# Patient Record
Sex: MALE | Race: WHITE | NOT HISPANIC OR LATINO | Employment: OTHER | URBAN - METROPOLITAN AREA
[De-identification: names, ages, dates, MRNs, and addresses within clinical notes are randomized per-mention and may not be internally consistent; named-entity substitution may affect disease eponyms.]

---

## 2021-10-08 ENCOUNTER — HOSPITAL ENCOUNTER (EMERGENCY)
Facility: HOSPITAL | Age: 71
Discharge: HOME | End: 2021-10-08
Attending: FAMILY MEDICINE
Payer: MEDICARE

## 2021-10-08 VITALS
DIASTOLIC BLOOD PRESSURE: 89 MMHG | HEART RATE: 86 BPM | TEMPERATURE: 98 F | WEIGHT: 165.38 LBS | OXYGEN SATURATION: 98 % | RESPIRATION RATE: 18 BRPM | SYSTOLIC BLOOD PRESSURE: 132 MMHG

## 2021-10-08 DIAGNOSIS — M25.559 HIP PAIN: Primary | ICD-10-CM

## 2021-10-08 LAB
ALBUMIN SERPL BCP-MCNC: 3.9 G/DL (ref 3.5–5.2)
ALP SERPL-CCNC: 41 U/L (ref 55–135)
ALT SERPL W/O P-5'-P-CCNC: 15 U/L (ref 10–44)
ANION GAP SERPL CALC-SCNC: 16 MMOL/L (ref 8–16)
AST SERPL-CCNC: 28 U/L (ref 10–40)
BASOPHILS # BLD AUTO: 0.01 K/UL (ref 0–0.2)
BASOPHILS NFR BLD: 0.2 % (ref 0–1.9)
BILIRUB SERPL-MCNC: 0.5 MG/DL (ref 0.1–1)
BUN SERPL-MCNC: 17 MG/DL (ref 8–23)
CALCIUM SERPL-MCNC: 10 MG/DL (ref 8.7–10.5)
CHLORIDE SERPL-SCNC: 102 MMOL/L (ref 95–110)
CO2 SERPL-SCNC: 25 MMOL/L (ref 23–29)
CREAT SERPL-MCNC: 2.4 MG/DL (ref 0.5–1.4)
DIFFERENTIAL METHOD: ABNORMAL
EOSINOPHIL # BLD AUTO: 0 K/UL (ref 0–0.5)
EOSINOPHIL NFR BLD: 0.2 % (ref 0–8)
ERYTHROCYTE [DISTWIDTH] IN BLOOD BY AUTOMATED COUNT: 16.2 % (ref 11.5–14.5)
EST. GFR  (AFRICAN AMERICAN): 30 ML/MIN/1.73 M^2
EST. GFR  (NON AFRICAN AMERICAN): 26 ML/MIN/1.73 M^2
GLUCOSE SERPL-MCNC: 124 MG/DL (ref 70–110)
HCT VFR BLD AUTO: 35.1 % (ref 40–54)
HCV AB SERPL QL IA: NEGATIVE
HEP C VIRUS HOLD SPECIMEN: NORMAL
HGB BLD-MCNC: 11.3 G/DL (ref 14–18)
IMM GRANULOCYTES # BLD AUTO: 0.04 K/UL (ref 0–0.04)
IMM GRANULOCYTES NFR BLD AUTO: 1 % (ref 0–0.5)
LACTATE SERPL-SCNC: 1.7 MMOL/L (ref 0.5–2.2)
LYMPHOCYTES # BLD AUTO: 2 K/UL (ref 1–4.8)
LYMPHOCYTES NFR BLD: 49.1 % (ref 18–48)
MCH RBC QN AUTO: 32.6 PG (ref 27–31)
MCHC RBC AUTO-ENTMCNC: 32.2 G/DL (ref 32–36)
MCV RBC AUTO: 101 FL (ref 82–98)
MONOCYTES # BLD AUTO: 0.4 K/UL (ref 0.3–1)
MONOCYTES NFR BLD: 10 % (ref 4–15)
NEUTROPHILS # BLD AUTO: 1.6 K/UL (ref 1.8–7.7)
NEUTROPHILS NFR BLD: 39.5 % (ref 38–73)
NRBC BLD-RTO: 0 /100 WBC
PLATELET # BLD AUTO: 85 K/UL (ref 150–450)
PMV BLD AUTO: 10.4 FL (ref 9.2–12.9)
POCT GLUCOSE: 115 MG/DL (ref 70–110)
POTASSIUM SERPL-SCNC: 3.7 MMOL/L (ref 3.5–5.1)
PROT SERPL-MCNC: 7.8 G/DL (ref 6–8.4)
RBC # BLD AUTO: 3.47 M/UL (ref 4.6–6.2)
SODIUM SERPL-SCNC: 143 MMOL/L (ref 136–145)
WBC # BLD AUTO: 4.11 K/UL (ref 3.9–12.7)

## 2021-10-08 PROCEDURE — 86803 HEPATITIS C AB TEST: CPT | Performed by: EMERGENCY MEDICINE

## 2021-10-08 PROCEDURE — 80053 COMPREHEN METABOLIC PANEL: CPT | Performed by: NURSE PRACTITIONER

## 2021-10-08 PROCEDURE — 99285 EMERGENCY DEPT VISIT HI MDM: CPT | Mod: 25

## 2021-10-08 PROCEDURE — 85025 COMPLETE CBC W/AUTO DIFF WBC: CPT | Performed by: NURSE PRACTITIONER

## 2021-10-08 PROCEDURE — 96360 HYDRATION IV INFUSION INIT: CPT

## 2021-10-08 PROCEDURE — 83605 ASSAY OF LACTIC ACID: CPT | Performed by: NURSE PRACTITIONER

## 2021-10-08 PROCEDURE — 82962 GLUCOSE BLOOD TEST: CPT

## 2021-10-08 PROCEDURE — 25000003 PHARM REV CODE 250: Performed by: FAMILY MEDICINE

## 2021-10-08 RX ORDER — HEPARIN SODIUM,PORCINE/D5W 25000/250
0-40 INTRAVENOUS SOLUTION INTRAVENOUS CONTINUOUS
Status: DISCONTINUED | OUTPATIENT
Start: 2021-10-08 | End: 2021-10-08

## 2021-10-08 RX ORDER — OXYCODONE AND ACETAMINOPHEN 10; 325 MG/1; MG/1
1 TABLET ORAL
Status: COMPLETED | OUTPATIENT
Start: 2021-10-08 | End: 2021-10-08

## 2021-10-08 RX ADMIN — SODIUM CHLORIDE 1000 ML: 0.9 INJECTION, SOLUTION INTRAVENOUS at 04:10

## 2021-10-08 RX ADMIN — OXYCODONE AND ACETAMINOPHEN 1 TABLET: 10; 325 TABLET ORAL at 07:10

## 2021-10-08 RX ADMIN — MAGNESIUM HYDROXIDE/ALUMINUM HYDROXICE/SIMETHICONE 50 ML: 120; 1200; 1200 SUSPENSION ORAL at 06:10

## 2022-04-23 ENCOUNTER — HOSPITAL ENCOUNTER (EMERGENCY)
Facility: HOSPITAL | Age: 72
Discharge: HOME | End: 2022-04-24
Attending: EMERGENCY MEDICINE
Payer: MEDICARE

## 2022-04-23 VITALS
RESPIRATION RATE: 19 BRPM | TEMPERATURE: 96 F | HEART RATE: 59 BPM | SYSTOLIC BLOOD PRESSURE: 131 MMHG | HEIGHT: 73 IN | WEIGHT: 171.06 LBS | DIASTOLIC BLOOD PRESSURE: 95 MMHG | OXYGEN SATURATION: 96 % | BODY MASS INDEX: 22.67 KG/M2

## 2022-04-23 DIAGNOSIS — R41.82 ALTERED MENTAL STATUS: ICD-10-CM

## 2022-04-23 LAB
ALBUMIN SERPL BCP-MCNC: 3.6 G/DL (ref 3.5–5.2)
ALP SERPL-CCNC: 39 U/L (ref 55–135)
ALT SERPL W/O P-5'-P-CCNC: 8 U/L (ref 10–44)
AMMONIA PLAS-SCNC: 29 UMOL/L (ref 10–50)
ANION GAP SERPL CALC-SCNC: 13 MMOL/L (ref 8–16)
APTT BLDCRRT: 31.4 SEC (ref 21–32)
AST SERPL-CCNC: 12 U/L (ref 10–40)
BASOPHILS # BLD AUTO: 0.02 K/UL (ref 0–0.2)
BASOPHILS NFR BLD: 0.3 % (ref 0–1.9)
BILIRUB SERPL-MCNC: 0.3 MG/DL (ref 0.1–1)
BILIRUB UR QL STRIP: NEGATIVE
BNP SERPL-MCNC: 1483 PG/ML (ref 0–99)
BUN SERPL-MCNC: 25 MG/DL (ref 8–23)
CALCIUM SERPL-MCNC: 9.6 MG/DL (ref 8.7–10.5)
CHLORIDE SERPL-SCNC: 101 MMOL/L (ref 95–110)
CK SERPL-CCNC: 131 U/L (ref 20–200)
CLARITY UR: CLEAR
CO2 SERPL-SCNC: 31 MMOL/L (ref 23–29)
COLOR UR: YELLOW
CREAT SERPL-MCNC: 1.5 MG/DL (ref 0.5–1.4)
DIFFERENTIAL METHOD: ABNORMAL
EOSINOPHIL # BLD AUTO: 0.1 K/UL (ref 0–0.5)
EOSINOPHIL NFR BLD: 1.7 % (ref 0–8)
ERYTHROCYTE [DISTWIDTH] IN BLOOD BY AUTOMATED COUNT: 15.5 % (ref 11.5–14.5)
EST. GFR  (AFRICAN AMERICAN): 52 ML/MIN/1.73 M^2
EST. GFR  (NON AFRICAN AMERICAN): 45 ML/MIN/1.73 M^2
GLUCOSE SERPL-MCNC: 108 MG/DL (ref 70–110)
GLUCOSE UR QL STRIP: NEGATIVE
HCT VFR BLD AUTO: 31 % (ref 40–54)
HGB BLD-MCNC: 9.5 G/DL (ref 14–18)
HGB UR QL STRIP: ABNORMAL
IMM GRANULOCYTES # BLD AUTO: 0.02 K/UL (ref 0–0.04)
IMM GRANULOCYTES NFR BLD AUTO: 0.3 % (ref 0–0.5)
INR PPP: 1.1 (ref 0.8–1.2)
KETONES UR QL STRIP: ABNORMAL
LEUKOCYTE ESTERASE UR QL STRIP: NEGATIVE
LYMPHOCYTES # BLD AUTO: 1.1 K/UL (ref 1–4.8)
LYMPHOCYTES NFR BLD: 16 % (ref 18–48)
MAGNESIUM SERPL-MCNC: 1.8 MG/DL (ref 1.6–2.6)
MCH RBC QN AUTO: 30.5 PG (ref 27–31)
MCHC RBC AUTO-ENTMCNC: 30.6 G/DL (ref 32–36)
MCV RBC AUTO: 100 FL (ref 82–98)
MONOCYTES # BLD AUTO: 0.8 K/UL (ref 0.3–1)
MONOCYTES NFR BLD: 11.5 % (ref 4–15)
NEUTROPHILS # BLD AUTO: 4.7 K/UL (ref 1.8–7.7)
NEUTROPHILS NFR BLD: 70.2 % (ref 38–73)
NITRITE UR QL STRIP: NEGATIVE
NRBC BLD-RTO: 0 /100 WBC
PH UR STRIP: 6 [PH] (ref 5–8)
PHOSPHATE SERPL-MCNC: 5.1 MG/DL (ref 2.7–4.5)
PLATELET # BLD AUTO: 146 K/UL (ref 150–450)
PMV BLD AUTO: 11.8 FL (ref 9.2–12.9)
POCT GLUCOSE: 117 MG/DL (ref 70–110)
POTASSIUM SERPL-SCNC: 3.9 MMOL/L (ref 3.5–5.1)
PROT SERPL-MCNC: 7.4 G/DL (ref 6–8.4)
PROT UR QL STRIP: ABNORMAL
PROTHROMBIN TIME: 11.4 SEC (ref 9–12.5)
RBC # BLD AUTO: 3.11 M/UL (ref 4.6–6.2)
SODIUM SERPL-SCNC: 145 MMOL/L (ref 136–145)
SP GR UR STRIP: >=1.03 (ref 1–1.03)
TROPONIN I SERPL DL<=0.01 NG/ML-MCNC: 0.03 NG/ML (ref 0–0.03)
TSH SERPL DL<=0.005 MIU/L-ACNC: 2.78 UIU/ML (ref 0.4–4)
URN SPEC COLLECT METH UR: ABNORMAL
UROBILINOGEN UR STRIP-ACNC: NEGATIVE EU/DL
WBC # BLD AUTO: 6.63 K/UL (ref 3.9–12.7)

## 2022-04-23 PROCEDURE — 82962 GLUCOSE BLOOD TEST: CPT

## 2022-04-23 PROCEDURE — 82550 ASSAY OF CK (CPK): CPT | Performed by: EMERGENCY MEDICINE

## 2022-04-23 PROCEDURE — 84484 ASSAY OF TROPONIN QUANT: CPT | Performed by: EMERGENCY MEDICINE

## 2022-04-23 PROCEDURE — 84100 ASSAY OF PHOSPHORUS: CPT | Performed by: EMERGENCY MEDICINE

## 2022-04-23 PROCEDURE — 84443 ASSAY THYROID STIM HORMONE: CPT | Performed by: EMERGENCY MEDICINE

## 2022-04-23 PROCEDURE — 93005 ELECTROCARDIOGRAM TRACING: CPT

## 2022-04-23 PROCEDURE — 82140 ASSAY OF AMMONIA: CPT | Performed by: EMERGENCY MEDICINE

## 2022-04-23 PROCEDURE — U0002 COVID-19 LAB TEST NON-CDC: HCPCS | Performed by: EMERGENCY MEDICINE

## 2022-04-23 PROCEDURE — 85610 PROTHROMBIN TIME: CPT | Performed by: EMERGENCY MEDICINE

## 2022-04-23 PROCEDURE — 80307 DRUG TEST PRSMV CHEM ANLYZR: CPT | Performed by: EMERGENCY MEDICINE

## 2022-04-23 PROCEDURE — 80053 COMPREHEN METABOLIC PANEL: CPT | Performed by: EMERGENCY MEDICINE

## 2022-04-23 PROCEDURE — 93010 ELECTROCARDIOGRAM REPORT: CPT | Mod: ,,, | Performed by: INTERNAL MEDICINE

## 2022-04-23 PROCEDURE — 83880 ASSAY OF NATRIURETIC PEPTIDE: CPT | Performed by: EMERGENCY MEDICINE

## 2022-04-23 PROCEDURE — 85730 THROMBOPLASTIN TIME PARTIAL: CPT | Performed by: EMERGENCY MEDICINE

## 2022-04-23 PROCEDURE — 93010 EKG 12-LEAD: ICD-10-PCS | Mod: ,,, | Performed by: INTERNAL MEDICINE

## 2022-04-23 PROCEDURE — 85025 COMPLETE CBC W/AUTO DIFF WBC: CPT | Performed by: EMERGENCY MEDICINE

## 2022-04-23 PROCEDURE — 81000 URINALYSIS NONAUTO W/SCOPE: CPT | Performed by: EMERGENCY MEDICINE

## 2022-04-23 PROCEDURE — 99285 EMERGENCY DEPT VISIT HI MDM: CPT | Mod: 25

## 2022-04-23 PROCEDURE — 83735 ASSAY OF MAGNESIUM: CPT | Performed by: EMERGENCY MEDICINE

## 2022-04-23 RX ORDER — BACLOFEN 10 MG/1
10 TABLET ORAL 3 TIMES DAILY PRN
Status: ON HOLD | COMMUNITY
Start: 2022-04-07 | End: 2023-02-07 | Stop reason: HOSPADM

## 2022-04-23 RX ORDER — MORPHINE SULFATE 30 MG/1
TABLET, FILM COATED, EXTENDED RELEASE ORAL
COMMUNITY
Start: 2022-04-12

## 2022-04-23 RX ORDER — ONDANSETRON 4 MG/1
4 TABLET, ORALLY DISINTEGRATING ORAL EVERY 8 HOURS PRN
COMMUNITY
Start: 2022-01-10

## 2022-04-23 RX ORDER — CARVEDILOL 12.5 MG/1
6.25 TABLET ORAL 2 TIMES DAILY
COMMUNITY
Start: 2022-01-10

## 2022-04-23 RX ORDER — GABAPENTIN 600 MG/1
600 TABLET ORAL 3 TIMES DAILY PRN
COMMUNITY
Start: 2021-12-20

## 2022-04-23 RX ORDER — FUROSEMIDE 40 MG/1
40 TABLET ORAL DAILY
COMMUNITY
Start: 2022-04-22

## 2022-04-23 RX ORDER — OXYCODONE HYDROCHLORIDE 10 MG/1
10 TABLET ORAL EVERY 8 HOURS PRN
COMMUNITY
Start: 2022-04-12

## 2022-04-24 LAB
AMPHET+METHAMPHET UR QL: NEGATIVE
AMPHET+METHAMPHET UR QL: NEGATIVE
BACTERIA #/AREA URNS HPF: NORMAL /HPF
BARBITURATES UR QL SCN>200 NG/ML: NEGATIVE
BARBITURATES UR QL SCN>200 NG/ML: NEGATIVE
BENZODIAZ UR QL SCN>200 NG/ML: NEGATIVE
BENZODIAZ UR QL SCN>200 NG/ML: NEGATIVE
BZE UR QL SCN: NEGATIVE
BZE UR QL SCN: NEGATIVE
CANNABINOIDS UR QL SCN: NEGATIVE
CANNABINOIDS UR QL SCN: NEGATIVE
CREAT UR-MCNC: 153 MG/DL (ref 23–375)
CREAT UR-MCNC: 153 MG/DL (ref 23–375)
CTP QC/QA: YES
HYALINE CASTS #/AREA URNS LPF: 1 /LPF
METHADONE UR QL SCN>300 NG/ML: NEGATIVE
METHADONE UR QL SCN>300 NG/ML: NEGATIVE
MICROSCOPIC COMMENT: NORMAL
OPIATES UR QL SCN: ABNORMAL
OPIATES UR QL SCN: ABNORMAL
PCP UR QL SCN>25 NG/ML: NEGATIVE
PCP UR QL SCN>25 NG/ML: NEGATIVE
RBC #/AREA URNS HPF: 0 /HPF (ref 0–4)
SARS-COV-2 RDRP RESP QL NAA+PROBE: NEGATIVE
TOXICOLOGY INFORMATION: ABNORMAL
TOXICOLOGY INFORMATION: ABNORMAL
WBC #/AREA URNS HPF: 0 /HPF (ref 0–5)

## 2022-04-24 PROCEDURE — 25000003 PHARM REV CODE 250: Performed by: EMERGENCY MEDICINE

## 2022-04-24 RX ORDER — LORAZEPAM 1 MG/1
1 TABLET ORAL
Status: COMPLETED | OUTPATIENT
Start: 2022-04-24 | End: 2022-04-24

## 2022-04-24 RX ADMIN — LORAZEPAM 1 MG: 1 TABLET ORAL at 01:04

## 2022-04-24 NOTE — ED PROVIDER NOTES
SCRIBE #1 NOTE: I, Nikita Liz, am scribing for, and in the presence of, Imani Rowe MD. I have scribed the entire note.       History     Chief Complaint   Patient presents with    Altered Mental Status     Pt family c/o of increased jerking movements, hallucinations, swollen feet. No previous hx of parkinsons or seizures     Review of patient's allergies indicates:  No Known Allergies      History of Present Illness     HPI    4/23/2022, 9:29 PM  History obtained from the patient      History of Present Illness: Ramez Miller is a 72 y.o. male patient who presents to the Emergency Department for evaluation of altered mental status which onset 2 days ago. Family reports that patient typically spends most of the day in bed and is wheelchair bound. Family reports that pt is also a former alcoholic but denies any recent use. Symptoms are constant and moderate in severity. No mitigating or exacerbating factors reported. Associated sxs include hallucinations, vomiting, fever, lower back pain, head twitching, and bilateral swollen feet. Patient denies any chills, chest pain, SOB, coughing, wheezing, and all other sxs at this time. Prior Tx includes Lasix. No further complaints or concerns at this time.       Arrival mode: Personal transportation    PCP: Primary Doctor No        Past Medical History:  No past medical history on file.    Past Surgical History:  No past surgical history on file.      Family History:  No family history on file.    Social History:  Social History     Tobacco Use    Smoking status: Not on file    Smokeless tobacco: Not on file   Substance and Sexual Activity    Alcohol use: Not on file    Drug use: Not on file    Sexual activity: Not on file        Review of Systems     Review of Systems   Constitutional: Positive for fever.   HENT: Negative for sore throat.    Respiratory: Negative for shortness of breath.    Cardiovascular: Negative for chest pain.   Gastrointestinal:  "Positive for vomiting. Negative for nausea.   Genitourinary: Negative for dysuria.   Musculoskeletal: Positive for back pain (Lower back).        (+) bilateral edema of feet       Skin: Negative for rash.   Neurological: Negative for weakness.        (+) intermittent head twitching   Hematological: Does not bruise/bleed easily.   Psychiatric/Behavioral: Positive for hallucinations.   All other systems reviewed and are negative.       Physical Exam     Initial Vitals [04/23/22 2006]   BP Pulse Resp Temp SpO2   (!) 125/56 (!) 47 19 96.3 °F (35.7 °C) 95 %      MAP       --          Physical Exam  Nursing Notes and Vital Signs Reviewed.  Constitutional: Patient is in no acute distress. Well-developed and well-nourished.  Head: Atraumatic. Normocephalic.  Eyes: PERRL. EOM intact. Conjunctivae are not pale. No scleral icterus.  ENT: Mucous membranes are moist. Oropharynx is clear and symmetric.    Neck: Supple. Full ROM. No lymphadenopathy.  Cardiovascular: Regular rate. Regular/Irregular rhythm. No murmurs, rubs, or gallops. Distal pulses are 2+ and symmetric.  Pulmonary/Chest: No respiratory distress. Clear to auscultation bilaterally. No wheezing or rales.  Abdominal: Soft and non-distended.  There is no tenderness.  No rebound, guarding, or rigidity. Good bowel sounds.  Genitourinary: No CVA tenderness  Musculoskeletal: Moves all extremities. No obvious deformities. No calf tenderness. 2+ bilateral pitting edema.  Skin: Warm and dry.  Neurological:  Alert, awake, and appropriate.  Normal speech.  No acute focal neurological deficits are appreciated.  Psychiatric: Normal affect. Good eye contact. Appropriate in content.     ED Course   Procedures  ED Vital Signs:  Vitals:    04/23/22 2006 04/23/22 2102 04/23/22 2132   BP: (!) 125/56 (!) 149/67 (!) 131/95   Pulse: (!) 47 88 (!) 59   Resp: 19 15 19   Temp: 96.3 °F (35.7 °C)     TempSrc: Axillary     SpO2: 95%  96%   Weight:   77.6 kg (171 lb 1.2 oz)   Height:   6' 1" " (1.854 m)       Abnormal Lab Results:  Labs Reviewed   CBC W/ AUTO DIFFERENTIAL - Abnormal; Notable for the following components:       Result Value    RBC 3.11 (*)     Hemoglobin 9.5 (*)     Hematocrit 31.0 (*)      (*)     MCHC 30.6 (*)     RDW 15.5 (*)     Platelets 146 (*)     Lymph % 16.0 (*)     All other components within normal limits   COMPREHENSIVE METABOLIC PANEL - Abnormal; Notable for the following components:    CO2 31 (*)     BUN 25 (*)     Creatinine 1.5 (*)     Alkaline Phosphatase 39 (*)     ALT 8 (*)     eGFR if  52 (*)     eGFR if non  45 (*)     All other components within normal limits   B-TYPE NATRIURETIC PEPTIDE - Abnormal; Notable for the following components:    BNP 1,483 (*)     All other components within normal limits   PHOSPHORUS - Abnormal; Notable for the following components:    Phosphorus 5.1 (*)     All other components within normal limits   TROPONIN I - Abnormal; Notable for the following components:    Troponin I 0.030 (*)     All other components within normal limits   URINALYSIS, REFLEX TO URINE CULTURE - Abnormal; Notable for the following components:    Specific Gravity, UA >=1.030 (*)     Protein, UA 2+ (*)     Ketones, UA Trace (*)     Occult Blood UA Trace (*)     All other components within normal limits    Narrative:     Specimen Source->Urine   DRUG SCREEN PANEL, URINE EMERGENCY - Abnormal; Notable for the following components:    Opiate Scrn, Ur Presumptive Positive (*)     All other components within normal limits    Narrative:     Specimen Source->Urine   DRUG SCREEN PANEL, URINE EMERGENCY - Abnormal; Notable for the following components:    Opiate Scrn, Ur Presumptive Positive (*)     All other components within normal limits    Narrative:     Specimen Source->Urine   POCT GLUCOSE - Abnormal; Notable for the following components:    POCT Glucose 117 (*)     All other components within normal limits   PROTIME-INR   APTT   CK    MAGNESIUM   TSH   AMMONIA   URINALYSIS MICROSCOPIC    Narrative:     Specimen Source->Urine   SARS-COV-2 RDRP GENE        All Lab Results:  Results for orders placed or performed during the hospital encounter of 04/23/22   CBC auto differential   Result Value Ref Range    WBC 6.63 3.90 - 12.70 K/uL    RBC 3.11 (L) 4.60 - 6.20 M/uL    Hemoglobin 9.5 (L) 14.0 - 18.0 g/dL    Hematocrit 31.0 (L) 40.0 - 54.0 %     (H) 82 - 98 fL    MCH 30.5 27.0 - 31.0 pg    MCHC 30.6 (L) 32.0 - 36.0 g/dL    RDW 15.5 (H) 11.5 - 14.5 %    Platelets 146 (L) 150 - 450 K/uL    MPV 11.8 9.2 - 12.9 fL    Immature Granulocytes 0.3 0.0 - 0.5 %    Gran # (ANC) 4.7 1.8 - 7.7 K/uL    Immature Grans (Abs) 0.02 0.00 - 0.04 K/uL    Lymph # 1.1 1.0 - 4.8 K/uL    Mono # 0.8 0.3 - 1.0 K/uL    Eos # 0.1 0.0 - 0.5 K/uL    Baso # 0.02 0.00 - 0.20 K/uL    nRBC 0 0 /100 WBC    Gran % 70.2 38.0 - 73.0 %    Lymph % 16.0 (L) 18.0 - 48.0 %    Mono % 11.5 4.0 - 15.0 %    Eosinophil % 1.7 0.0 - 8.0 %    Basophil % 0.3 0.0 - 1.9 %    Differential Method Automated    Comprehensive metabolic panel   Result Value Ref Range    Sodium 145 136 - 145 mmol/L    Potassium 3.9 3.5 - 5.1 mmol/L    Chloride 101 95 - 110 mmol/L    CO2 31 (H) 23 - 29 mmol/L    Glucose 108 70 - 110 mg/dL    BUN 25 (H) 8 - 23 mg/dL    Creatinine 1.5 (H) 0.5 - 1.4 mg/dL    Calcium 9.6 8.7 - 10.5 mg/dL    Total Protein 7.4 6.0 - 8.4 g/dL    Albumin 3.6 3.5 - 5.2 g/dL    Total Bilirubin 0.3 0.1 - 1.0 mg/dL    Alkaline Phosphatase 39 (L) 55 - 135 U/L    AST 12 10 - 40 U/L    ALT 8 (L) 10 - 44 U/L    Anion Gap 13 8 - 16 mmol/L    eGFR if African American 52 (A) >60 mL/min/1.73 m^2    eGFR if non African American 45 (A) >60 mL/min/1.73 m^2   Protime-INR   Result Value Ref Range    Prothrombin Time 11.4 9.0 - 12.5 sec    INR 1.1 0.8 - 1.2   APTT   Result Value Ref Range    aPTT 31.4 21.0 - 32.0 sec   CPK   Result Value Ref Range     20 - 200 U/L   Brain natriuretic peptide   Result Value  Ref Range    BNP 1,483 (H) 0 - 99 pg/mL   Phosphorus   Result Value Ref Range    Phosphorus 5.1 (H) 2.7 - 4.5 mg/dL   Magnesium   Result Value Ref Range    Magnesium 1.8 1.6 - 2.6 mg/dL   Troponin I   Result Value Ref Range    Troponin I 0.030 (H) 0.000 - 0.026 ng/mL   Urinalysis, Reflex to Urine Culture Urine, Clean Catch    Specimen: Urine   Result Value Ref Range    Specimen UA Urine, Clean Catch     Color, UA Yellow Yellow, Straw, Jelena    Appearance, UA Clear Clear    pH, UA 6.0 5.0 - 8.0    Specific Gravity, UA >=1.030 (A) 1.005 - 1.030    Protein, UA 2+ (A) Negative    Glucose, UA Negative Negative    Ketones, UA Trace (A) Negative    Bilirubin (UA) Negative Negative    Occult Blood UA Trace (A) Negative    Nitrite, UA Negative Negative    Urobilinogen, UA Negative <2.0 EU/dL    Leukocytes, UA Negative Negative   Drug screen panel, emergency   Result Value Ref Range    Benzodiazepines Negative Negative    Methadone metabolites Negative Negative    Cocaine (Metab.) Negative Negative    Opiate Scrn, Ur Presumptive Positive (A) Negative    Barbiturate Screen, Ur Negative Negative    Amphetamine Screen, Ur Negative Negative    THC Negative Negative    Phencyclidine Negative Negative    Creatinine, Urine 153.0 23.0 - 375.0 mg/dL    Toxicology Information SEE COMMENT    TSH   Result Value Ref Range    TSH 2.783 0.400 - 4.000 uIU/mL   Ammonia   Result Value Ref Range    Ammonia 29 10 - 50 umol/L   Drug screen panel, emergency   Result Value Ref Range    Benzodiazepines Negative Negative    Methadone metabolites Negative Negative    Cocaine (Metab.) Negative Negative    Opiate Scrn, Ur Presumptive Positive (A) Negative    Barbiturate Screen, Ur Negative Negative    Amphetamine Screen, Ur Negative Negative    THC Negative Negative    Phencyclidine Negative Negative    Creatinine, Urine 153.0 23.0 - 375.0 mg/dL    Toxicology Information SEE COMMENT    Urinalysis Microscopic   Result Value Ref Range    RBC, UA 0 0 - 4  /hpf    WBC, UA 0 0 - 5 /hpf    Bacteria Rare None-Occ /hpf    Hyaline Casts, UA 1 0-1/lpf /lpf    Microscopic Comment SEE COMMENT    POCT COVID-19 Rapid Screening   Result Value Ref Range    POC Rapid COVID Negative Negative     Acceptable Yes    POCT glucose   Result Value Ref Range    POCT Glucose 117 (H) 70 - 110 mg/dL         Imaging Results:  Imaging Results          CT Head Without Contrast (Final result)  Result time 04/23/22 23:23:29    Final result by Cuate Joya MD (04/23/22 23:23:29)                 Impression:      No acute intracranial CT abnormality.    All CT scans at this facility are performed  using dose modulation techniques as appropriate to performed exam including the following:  automated exposure control; adjustment of mA and/or kV according to the patients size (this includes techniques or standardized protocols for targeted exams where dose is matched to indication/reason for exam: i.e. extremities or head);  iterative reconstruction technique.      Electronically signed by: Cuate Joya  Date:    04/23/2022  Time:    23:23             Narrative:    EXAMINATION:  CT HEAD WITHOUT CONTRAST    CLINICAL HISTORY:  Mental status change, unknown cause;    TECHNIQUE:  Low dose axial CT images obtained throughout the head without intravenous contrast. Sagittal and coronal reconstructions were performed.    COMPARISON:  10/08/2021    FINDINGS:  Intracranial compartment:    Ventricles and sulci are normal in size for age without evidence of hydrocephalus. No extra-axial blood or fluid collections.    The brain parenchyma appears normal. No parenchymal mass, hemorrhage, edema or major vascular distribution infarct.    Skull/extracranial contents (limited evaluation): No fracture. Mastoid air cells and paranasal sinuses are essentially clear.                               X-Ray Chest AP Portable (Final result)  Result time 04/23/22 21:20:27    Final result by Cuate Joya MD  (04/23/22 21:20:27)                 Impression:      No acute abnormality.      Electronically signed by: Cuate Mahnaz  Date:    04/23/2022  Time:    21:20             Narrative:    EXAMINATION:  XR CHEST AP PORTABLE    CLINICAL HISTORY:  Altered mental status, unspecified    TECHNIQUE:  Single frontal view of the chest was performed.    COMPARISON:  None    FINDINGS:  The lungs are clear, with normal appearance of pulmonary vasculature and no pleural effusion or pneumothorax.    The cardiac silhouette is borderline enlarged.  The hilar and mediastinal contours are unremarkable.    Bones are intact.                                 The EKG was ordered, reviewed, and independently interpreted by the ED provider.  Interpretation time: 21:02  Rate: 84 BPM  Rhythm: Sinus rhythm with frequent premature ventricular complexes in a pattern of bigeminy   Interpretation: Left axis deviation. RBBB. No STEMI.           The Emergency Provider reviewed the vital signs and test results, which are outlined above.     ED Discussion       12:34 AM: Discussed pt's case with Dr. Escalante ([facility] medicine) who notes that pt's creatine is at baseline, and recommends pt be seen by movement specialist.    12:57 AM: Reassessed pt at this time. Discussed pt dx and plan of tx. Gave pt all f/u and return to the ED instructions. All questions and concerns were addressed at this time. Pt expresses understanding of information and instructions, and is comfortable with plan to discharge. Pt is stable for discharge.    I discussed with patient and/or family/caretaker that evaluation in the ED does not suggest any emergent or life threatening medical conditions requiring immediate intervention beyond what was provided in the ED, and I believe patient is safe for discharge.  Regardless, an unremarkable evaluation in the ED does not preclude the development or presence of a serious of life threatening condition. As such, patient was instructed to  return immediately for any worsening or change in current symptoms.           Medical Decision Making:   Clinical Tests:   Lab Tests: Ordered and Reviewed  Radiological Study: Ordered and Reviewed  Medical Tests: Ordered and Reviewed           ED Medication(s):  Medications   LORazepam tablet 1 mg (1 mg Oral Given 4/24/22 0104)       Discharge Medication List as of 4/24/2022 12:49 AM           Follow-up Information     Your doctor In 1 day.           [facility] - Emergency Dept..    Specialty: Emergency Medicine  Why: As needed, If symptoms worsen  Contact information:  [id number] [place] [id number] [phone]                           Scribe Attestation:   Scribe #1: I performed the above scribed service and the documentation accurately describes the services I performed. I attest to the accuracy of the note.     Attending:   Physician Attestation Statement for Scribe #1: I, Imani Rowe MD, personally performed the services described in this documentation, as scribed by Nikita Hill, in my presence, and it is both accurate and complete.           Clinical Impression       ICD-10-CM ICD-9-CM   1. Altered mental status  R41.82 780.97   2. Altered mental status  R41.82 780.97       Disposition:   Disposition: Discharged  Condition: Stable         Imani Rowe MD  04/24/22 [id number]

## 2022-12-06 ENCOUNTER — PATIENT MESSAGE (OUTPATIENT)
Dept: RESEARCH | Facility: HOSPITAL | Age: 72
End: 2022-12-06
Payer: MEDICARE

## 2023-02-05 ENCOUNTER — HOSPITAL ENCOUNTER (INPATIENT)
Facility: HOSPITAL | Age: 73
LOS: 1 days | Discharge: HOME HEALTH | DRG: 896 | End: 2023-02-07
Attending: EMERGENCY MEDICINE | Admitting: FAMILY MEDICINE
Payer: MEDICARE

## 2023-02-05 DIAGNOSIS — R41.82 ALTERED MENTAL STATUS, UNSPECIFIED ALTERED MENTAL STATUS TYPE: Primary | ICD-10-CM

## 2023-02-05 DIAGNOSIS — I50.9 CHF (CONGESTIVE HEART FAILURE): ICD-10-CM

## 2023-02-05 DIAGNOSIS — R07.9 CHEST PAIN: ICD-10-CM

## 2023-02-05 PROBLEM — I10 HYPERTENSION: Status: ACTIVE | Noted: 2023-02-05

## 2023-02-05 PROBLEM — D69.6 THROMBOCYTOPENIA: Status: ACTIVE | Noted: 2023-02-05

## 2023-02-05 PROBLEM — R41.0 DISORIENTATION: Status: ACTIVE | Noted: 2023-02-05

## 2023-02-05 PROBLEM — G89.29 CHRONIC PAIN: Status: ACTIVE | Noted: 2023-02-05

## 2023-02-05 PROBLEM — N17.9 AKI (ACUTE KIDNEY INJURY): Status: ACTIVE | Noted: 2023-02-05

## 2023-02-05 LAB
ALBUMIN SERPL BCP-MCNC: 3.8 G/DL (ref 3.5–5.2)
ALP SERPL-CCNC: 74 U/L (ref 55–135)
ALT SERPL W/O P-5'-P-CCNC: 34 U/L (ref 10–44)
AMMONIA PLAS-SCNC: 35 UMOL/L (ref 10–50)
AMPHET+METHAMPHET UR QL: NEGATIVE
ANION GAP SERPL CALC-SCNC: 19 MMOL/L (ref 8–16)
AST SERPL-CCNC: 45 U/L (ref 10–40)
AV INDEX (PROSTH): 0.84
AV MEAN GRADIENT: 2 MMHG
AV PEAK GRADIENT: 3 MMHG
AV VALVE AREA: 2.61 CM2
AV VELOCITY RATIO: 1.06
BACTERIA #/AREA URNS HPF: ABNORMAL /HPF
BARBITURATES UR QL SCN>200 NG/ML: NEGATIVE
BASOPHILS # BLD AUTO: 0.03 K/UL (ref 0–0.2)
BASOPHILS NFR BLD: 0.5 % (ref 0–1.9)
BENZODIAZ UR QL SCN>200 NG/ML: NEGATIVE
BILIRUB SERPL-MCNC: 1.1 MG/DL (ref 0.1–1)
BILIRUB UR QL STRIP: NEGATIVE
BNP SERPL-MCNC: 161 PG/ML (ref 0–99)
BUN SERPL-MCNC: 29 MG/DL (ref 8–23)
BZE UR QL SCN: NEGATIVE
CALCIUM SERPL-MCNC: 8.6 MG/DL (ref 8.7–10.5)
CANNABINOIDS UR QL SCN: NEGATIVE
CHLORIDE SERPL-SCNC: 94 MMOL/L (ref 95–110)
CLARITY UR: CLEAR
CO2 SERPL-SCNC: 27 MMOL/L (ref 23–29)
COLOR UR: YELLOW
CREAT SERPL-MCNC: 1.8 MG/DL (ref 0.5–1.4)
CREAT UR-MCNC: 214.3 MG/DL (ref 23–375)
CV ECHO LV RWT: 0.6 CM
DIFFERENTIAL METHOD: ABNORMAL
DOP CALC AO PEAK VEL: 0.88 M/S
DOP CALC AO VTI: 19.3 CM
DOP CALC LVOT AREA: 3.1 CM2
DOP CALC LVOT DIAMETER: 1.99 CM
DOP CALC LVOT PEAK VEL: 0.93 M/S
DOP CALC LVOT STROKE VOLUME: 50.36 CM3
DOP CALCLVOT PEAK VEL VTI: 16.2 CM
E WAVE DECELERATION TIME: 254.75 MSEC
E/A RATIO: 0.83
E/E' RATIO: 9.07 M/S
ECHO LV POSTERIOR WALL: 1.32 CM (ref 0.6–1.1)
EJECTION FRACTION: 55 %
EOSINOPHIL # BLD AUTO: 0 K/UL (ref 0–0.5)
EOSINOPHIL NFR BLD: 0.5 % (ref 0–8)
ERYTHROCYTE [DISTWIDTH] IN BLOOD BY AUTOMATED COUNT: 15 % (ref 11.5–14.5)
EST. GFR  (NO RACE VARIABLE): 39 ML/MIN/1.73 M^2
ETHANOL SERPL-MCNC: <10 MG/DL
FRACTIONAL SHORTENING: 23 % (ref 28–44)
GLUCOSE SERPL-MCNC: 96 MG/DL (ref 70–110)
GLUCOSE UR QL STRIP: NEGATIVE
HCT VFR BLD AUTO: 39.7 % (ref 40–54)
HGB BLD-MCNC: 13.4 G/DL (ref 14–18)
HGB UR QL STRIP: ABNORMAL
HYALINE CASTS #/AREA URNS LPF: 3 /LPF
IMM GRANULOCYTES # BLD AUTO: 0.04 K/UL (ref 0–0.04)
IMM GRANULOCYTES NFR BLD AUTO: 0.6 % (ref 0–0.5)
INTERVENTRICULAR SEPTUM: 1.3 CM (ref 0.6–1.1)
IVC DIAMETER: 1.1 CM
IVRT: 95.15 MSEC
KETONES UR QL STRIP: NEGATIVE
LA MAJOR: 7.03 CM
LA MINOR: 4.7 CM
LEFT ATRIUM SIZE: 4.26 CM
LEFT INTERNAL DIMENSION IN SYSTOLE: 3.39 CM (ref 2.1–4)
LEFT VENTRICLE DIASTOLIC VOLUME: 89.13 ML
LEFT VENTRICLE SYSTOLIC VOLUME: 46.99 ML
LEFT VENTRICULAR INTERNAL DIMENSION IN DIASTOLE: 4.43 CM (ref 3.5–6)
LEFT VENTRICULAR MASS: 219.8 G
LEUKOCYTE ESTERASE UR QL STRIP: ABNORMAL
LV LATERAL E/E' RATIO: 8.5 M/S
LV SEPTAL E/E' RATIO: 9.71 M/S
LVOT MG: 1.25 MMHG
LVOT MV: 0.51 CM/S
LYMPHOCYTES # BLD AUTO: 1.1 K/UL (ref 1–4.8)
LYMPHOCYTES NFR BLD: 17.9 % (ref 18–48)
MCH RBC QN AUTO: 32 PG (ref 27–31)
MCHC RBC AUTO-ENTMCNC: 33.8 G/DL (ref 32–36)
MCV RBC AUTO: 95 FL (ref 82–98)
METHADONE UR QL SCN>300 NG/ML: NEGATIVE
MICROSCOPIC COMMENT: ABNORMAL
MONOCYTES # BLD AUTO: 0.8 K/UL (ref 0.3–1)
MONOCYTES NFR BLD: 12.3 % (ref 4–15)
MV PEAK A VEL: 0.82 M/S
MV PEAK E VEL: 0.68 M/S
MV STENOSIS PRESSURE HALF TIME: 73.88 MS
MV VALVE AREA P 1/2 METHOD: 2.98 CM2
NEUTROPHILS # BLD AUTO: 4.3 K/UL (ref 1.8–7.7)
NEUTROPHILS NFR BLD: 68.2 % (ref 38–73)
NITRITE UR QL STRIP: NEGATIVE
NRBC BLD-RTO: 0 /100 WBC
OPIATES UR QL SCN: ABNORMAL
PCP UR QL SCN>25 NG/ML: NEGATIVE
PH UR STRIP: 6 [PH] (ref 5–8)
PLATELET # BLD AUTO: 110 K/UL (ref 150–450)
PMV BLD AUTO: 11.2 FL (ref 9.2–12.9)
POTASSIUM SERPL-SCNC: 3.9 MMOL/L (ref 3.5–5.1)
PROT SERPL-MCNC: 7.6 G/DL (ref 6–8.4)
PROT UR QL STRIP: ABNORMAL
PV MV: 0.67 M/S
PV PEAK VELOCITY: 0.88 CM/S
RA MAJOR: 5.62 CM
RBC # BLD AUTO: 4.19 M/UL (ref 4.6–6.2)
RBC #/AREA URNS HPF: 13 /HPF (ref 0–4)
SODIUM SERPL-SCNC: 140 MMOL/L (ref 136–145)
SP GR UR STRIP: 1.02 (ref 1–1.03)
STJ: 2.16 CM
TDI LATERAL: 0.08 M/S
TDI SEPTAL: 0.07 M/S
TDI: 0.08 M/S
TOXICOLOGY INFORMATION: ABNORMAL
TROPONIN I SERPL DL<=0.01 NG/ML-MCNC: 0.03 NG/ML (ref 0–0.03)
TROPONIN I SERPL DL<=0.01 NG/ML-MCNC: 0.03 NG/ML (ref 0–0.03)
TROPONIN I SERPL DL<=0.01 NG/ML-MCNC: 0.05 NG/ML (ref 0–0.03)
UNIDENT CRYS URNS QL MICRO: ABNORMAL
URN SPEC COLLECT METH UR: ABNORMAL
UROBILINOGEN UR STRIP-ACNC: ABNORMAL EU/DL
WBC # BLD AUTO: 6.27 K/UL (ref 3.9–12.7)
WBC #/AREA URNS HPF: 6 /HPF (ref 0–5)
WBC CLUMPS URNS QL MICRO: ABNORMAL

## 2023-02-05 PROCEDURE — 80307 DRUG TEST PRSMV CHEM ANLYZR: CPT | Performed by: PHYSICIAN ASSISTANT

## 2023-02-05 PROCEDURE — 93010 ELECTROCARDIOGRAM REPORT: CPT | Mod: ,,, | Performed by: STUDENT IN AN ORGANIZED HEALTH CARE EDUCATION/TRAINING PROGRAM

## 2023-02-05 PROCEDURE — 96366 THER/PROPH/DIAG IV INF ADDON: CPT

## 2023-02-05 PROCEDURE — G0378 HOSPITAL OBSERVATION PER HR: HCPCS

## 2023-02-05 PROCEDURE — 85025 COMPLETE CBC W/AUTO DIFF WBC: CPT | Performed by: PHYSICIAN ASSISTANT

## 2023-02-05 PROCEDURE — 25000003 PHARM REV CODE 250: Performed by: FAMILY MEDICINE

## 2023-02-05 PROCEDURE — 84484 ASSAY OF TROPONIN QUANT: CPT | Mod: 91 | Performed by: PHYSICIAN ASSISTANT

## 2023-02-05 PROCEDURE — 93005 ELECTROCARDIOGRAM TRACING: CPT

## 2023-02-05 PROCEDURE — 84484 ASSAY OF TROPONIN QUANT: CPT | Mod: 91 | Performed by: NURSE PRACTITIONER

## 2023-02-05 PROCEDURE — 63600175 PHARM REV CODE 636 W HCPCS: Performed by: EMERGENCY MEDICINE

## 2023-02-05 PROCEDURE — 63600175 PHARM REV CODE 636 W HCPCS: Performed by: NURSE PRACTITIONER

## 2023-02-05 PROCEDURE — 81000 URINALYSIS NONAUTO W/SCOPE: CPT | Mod: 59 | Performed by: PHYSICIAN ASSISTANT

## 2023-02-05 PROCEDURE — 96372 THER/PROPH/DIAG INJ SC/IM: CPT | Performed by: FAMILY MEDICINE

## 2023-02-05 PROCEDURE — 82140 ASSAY OF AMMONIA: CPT | Performed by: EMERGENCY MEDICINE

## 2023-02-05 PROCEDURE — 96374 THER/PROPH/DIAG INJ IV PUSH: CPT

## 2023-02-05 PROCEDURE — 80053 COMPREHEN METABOLIC PANEL: CPT | Performed by: PHYSICIAN ASSISTANT

## 2023-02-05 PROCEDURE — 99285 EMERGENCY DEPT VISIT HI MDM: CPT | Mod: 25

## 2023-02-05 PROCEDURE — 63600175 PHARM REV CODE 636 W HCPCS: Performed by: FAMILY MEDICINE

## 2023-02-05 PROCEDURE — 96375 TX/PRO/DX INJ NEW DRUG ADDON: CPT

## 2023-02-05 PROCEDURE — 83880 ASSAY OF NATRIURETIC PEPTIDE: CPT | Performed by: PHYSICIAN ASSISTANT

## 2023-02-05 PROCEDURE — 93010 EKG 12-LEAD: ICD-10-PCS | Mod: ,,, | Performed by: STUDENT IN AN ORGANIZED HEALTH CARE EDUCATION/TRAINING PROGRAM

## 2023-02-05 PROCEDURE — 25000003 PHARM REV CODE 250: Performed by: PHYSICIAN ASSISTANT

## 2023-02-05 PROCEDURE — 82077 ASSAY SPEC XCP UR&BREATH IA: CPT | Performed by: EMERGENCY MEDICINE

## 2023-02-05 RX ORDER — ENOXAPARIN SODIUM 100 MG/ML
40 INJECTION SUBCUTANEOUS EVERY 24 HOURS
Status: DISCONTINUED | OUTPATIENT
Start: 2023-02-05 | End: 2023-02-07 | Stop reason: HOSPADM

## 2023-02-05 RX ORDER — LORAZEPAM 2 MG/ML
1 INJECTION INTRAMUSCULAR ONCE
Status: COMPLETED | OUTPATIENT
Start: 2023-02-05 | End: 2023-02-05

## 2023-02-05 RX ORDER — LORAZEPAM 2 MG/ML
1 INJECTION INTRAMUSCULAR
Status: COMPLETED | OUTPATIENT
Start: 2023-02-05 | End: 2023-02-05

## 2023-02-05 RX ORDER — FUROSEMIDE 40 MG/1
40 TABLET ORAL DAILY
Status: DISCONTINUED | OUTPATIENT
Start: 2023-02-05 | End: 2023-02-07

## 2023-02-05 RX ORDER — ACETAMINOPHEN 325 MG/1
650 TABLET ORAL EVERY 4 HOURS PRN
Status: DISCONTINUED | OUTPATIENT
Start: 2023-02-05 | End: 2023-02-07 | Stop reason: HOSPADM

## 2023-02-05 RX ORDER — HYDRALAZINE HYDROCHLORIDE 20 MG/ML
20 INJECTION INTRAMUSCULAR; INTRAVENOUS
Status: DISCONTINUED | OUTPATIENT
Start: 2023-02-05 | End: 2023-02-05

## 2023-02-05 RX ORDER — SODIUM CHLORIDE 0.9 % (FLUSH) 0.9 %
10 SYRINGE (ML) INJECTION
Status: DISCONTINUED | OUTPATIENT
Start: 2023-02-05 | End: 2023-02-07 | Stop reason: HOSPADM

## 2023-02-05 RX ORDER — CARVEDILOL 6.25 MG/1
6.25 TABLET ORAL 2 TIMES DAILY
Status: DISCONTINUED | OUTPATIENT
Start: 2023-02-05 | End: 2023-02-07 | Stop reason: HOSPADM

## 2023-02-05 RX ORDER — HYDRALAZINE HYDROCHLORIDE 20 MG/ML
10 INJECTION INTRAMUSCULAR; INTRAVENOUS
Status: COMPLETED | OUTPATIENT
Start: 2023-02-05 | End: 2023-02-05

## 2023-02-05 RX ORDER — HYDROCODONE BITARTRATE AND ACETAMINOPHEN 5; 325 MG/1; MG/1
1 TABLET ORAL EVERY 4 HOURS PRN
Status: DISCONTINUED | OUTPATIENT
Start: 2023-02-05 | End: 2023-02-07 | Stop reason: HOSPADM

## 2023-02-05 RX ORDER — ASPIRIN 325 MG
325 TABLET ORAL
Status: COMPLETED | OUTPATIENT
Start: 2023-02-05 | End: 2023-02-05

## 2023-02-05 RX ORDER — ACETAMINOPHEN 325 MG/1
650 TABLET ORAL EVERY 8 HOURS PRN
Status: DISCONTINUED | OUTPATIENT
Start: 2023-02-05 | End: 2023-02-07 | Stop reason: HOSPADM

## 2023-02-05 RX ORDER — SODIUM CHLORIDE 9 MG/ML
INJECTION, SOLUTION INTRAVENOUS CONTINUOUS
Status: DISCONTINUED | OUTPATIENT
Start: 2023-02-05 | End: 2023-02-07

## 2023-02-05 RX ADMIN — LORAZEPAM 1 MG: 2 INJECTION INTRAMUSCULAR; INTRAVENOUS at 12:02

## 2023-02-05 RX ADMIN — ASPIRIN 325 MG ORAL TABLET 325 MG: 325 PILL ORAL at 09:02

## 2023-02-05 RX ADMIN — ACETAMINOPHEN 650 MG: 325 TABLET ORAL at 07:02

## 2023-02-05 RX ADMIN — SODIUM CHLORIDE: 9 INJECTION, SOLUTION INTRAVENOUS at 01:02

## 2023-02-05 RX ADMIN — CARVEDILOL 6.25 MG: 6.25 TABLET, FILM COATED ORAL at 08:02

## 2023-02-05 RX ADMIN — FUROSEMIDE 40 MG: 40 TABLET ORAL at 01:02

## 2023-02-05 RX ADMIN — LORAZEPAM 1 MG: 2 INJECTION INTRAMUSCULAR; INTRAVENOUS at 08:02

## 2023-02-05 RX ADMIN — SACUBITRIL AND VALSARTAN 1 TABLET: 24; 26 TABLET, FILM COATED ORAL at 09:02

## 2023-02-05 RX ADMIN — HYDRALAZINE HYDROCHLORIDE 10 MG: 20 INJECTION, SOLUTION INTRAMUSCULAR; INTRAVENOUS at 12:02

## 2023-02-05 RX ADMIN — ENOXAPARIN SODIUM 40 MG: 40 INJECTION SUBCUTANEOUS at 06:02

## 2023-02-05 NOTE — SUBJECTIVE & OBJECTIVE
Past Medical History:   Diagnosis Date    Chronic pain     Hypertension        History reviewed. No pertinent surgical history.    Review of patient's allergies indicates:  No Known Allergies    No current facility-administered medications on file prior to encounter.     Current Outpatient Medications on File Prior to Encounter   Medication Sig    baclofen (LIORESAL) 10 MG tablet Take 10 mg by mouth 3 (three) times daily as needed.    carvediloL (COREG) 12.5 MG tablet Take 6.25 mg by mouth 2 (two) times daily.    furosemide (LASIX) 40 MG tablet Take 40 mg by mouth once daily.    gabapentin (NEURONTIN) 600 MG tablet Take 600 mg by mouth 3 (three) times daily as needed.    morphine (MS CONTIN) 30 MG 12 hr tablet SMARTSI Tablet(s) By Mouth Every 12 Hours PRN    ondansetron (ZOFRAN-ODT) 4 MG TbDL Take 4 mg by mouth every 8 (eight) hours as needed.    oxyCODONE (ROXICODONE) 10 mg Tab immediate release tablet Take 10 mg by mouth every 8 (eight) hours as needed.    sacubitriL-valsartan (ENTRESTO) 24-26 mg per tablet Take 1 tablet by mouth 2 (two) times daily.     Family History    None       Tobacco Use    Smoking status: Not on file    Smokeless tobacco: Not on file   Substance and Sexual Activity    Alcohol use: Not on file    Drug use: Not on file    Sexual activity: Not on file     Review of Systems   Unable to perform ROS: Mental status change   Objective:     Vital Signs (Most Recent):  Temp: 98.1 °F (36.7 °C) (23 0842)  Pulse: 102 (23 1230)  Resp: (!) 26 (23 1230)  BP: 131/89 (23 1230)  SpO2: (!) 94 % (23 1230)   Vital Signs (24h Range):  Temp:  [98.1 °F (36.7 °C)] 98.1 °F (36.7 °C)  Pulse:  [] 102  Resp:  [18-37] 26  SpO2:  [94 %-97 %] 94 %  BP: (131-226)/() 131/89     Weight: 98.8 kg (217 lb 14.4 oz)  Body mass index is 28.75 kg/m².    Physical Exam  Constitutional:       Comments: Chronically ill appearing, lethargic, opens eyes to voice   Cardiovascular:      Rate and  Rhythm: Normal rate and regular rhythm.      Pulses: Normal pulses.      Heart sounds: Normal heart sounds.   Pulmonary:      Effort: Pulmonary effort is normal.      Breath sounds: Normal breath sounds. No wheezing.   Abdominal:      General: Bowel sounds are normal. There is no distension.      Palpations: Abdomen is soft.      Tenderness: There is no abdominal tenderness.   Musculoskeletal:         General: No swelling.   Skin:     General: Skin is warm and dry.   Neurological:      Comments: Lethargic, opens eyes to painful stimuli, not following commands, moves all extremities spontaneously          Significant Labs: All pertinent labs within the past 24 hours have been reviewed.    Significant Imaging: I have reviewed all pertinent imaging results/findings within the past 24 hours.

## 2023-02-05 NOTE — ED NOTES
Gave pt urinal. Pt is unable to give sample at this time, but will use call-light when able to give sample. Call-light within reach. Primary nurse notified.

## 2023-02-05 NOTE — ED PROVIDER NOTES
"SCRIBE #1 NOTE: I, Aida Judi, am scribing for, and in the presence of, George Us Jr., MD. I have scribed the entire note.       History     Chief Complaint   Patient presents with    Neck Pain    Altered Mental Status     Review of patient's allergies indicates:  No Known Allergies      History of Present Illness     HPI    2/5/2023, 10:24 AM  History obtained from the patient and family member      History of Present Illness: Ramez Miller is a 72 y.o. male patient who presents to the Emergency Department for evaluation of AMS x 2 days. Pt's family states pt has been "acting like he is hallucinating."  Pt reports chronic neck and shoulder pain. Pt's family member denies pt having any recent alcohol or recent recreational drug use.  Symptoms are constant and moderate in severity. No mitigating or exacerbating factors reported. Patient denies any fever, chills, SOB, abdominal pain, dysuria, and all other sxs at this time.  No further complaints or concerns at this time.       Arrival mode: Ambulance service     PCP: Primary Doctor No        Past Medical History:  Past Medical History:   Diagnosis Date    Chronic pain     Hypertension        Past Surgical History:  History reviewed. No pertinent surgical history.      Family History:  History reviewed. No pertinent family history.    Social History:  Social History     Tobacco Use    Smoking status: Not on file    Smokeless tobacco: Not on file   Substance and Sexual Activity    Alcohol use: Not on file    Drug use: Not on file    Sexual activity: Not on file        Review of Systems     Review of Systems   Constitutional:  Negative for chills and fever.   HENT:  Negative for sore throat.    Respiratory:  Negative for shortness of breath.    Cardiovascular:  Negative for chest pain.   Gastrointestinal:  Negative for abdominal pain and nausea.   Genitourinary:  Negative for dysuria.   Musculoskeletal:  Negative for back pain.   Skin:  Negative for rash. "   Neurological:  Negative for weakness.   Hematological:  Does not bruise/bleed easily.   Psychiatric/Behavioral:  Positive for confusion.    All other systems reviewed and are negative.     Physical Exam     Initial Vitals   BP Pulse Resp Temp SpO2   02/05/23 0834 02/05/23 0834 02/05/23 0834 02/05/23 0842 02/05/23 0834   (!) 160/88 78 18 98.1 °F (36.7 °C) 97 %      MAP       --                 Physical Exam  Nursing Notes and Vital Signs Reviewed.  Constitutional: Patient is in no acute distress. Well-developed and well-nourished.  Head: Atraumatic. Normocephalic.  Eyes:  EOM intact.  No scleral icterus.  ENT: Mucous membranes are moist.  Nares clear   Neck:  Full ROM. No JVD.  Cardiovascular: Regular rate. Regular rhythm No murmurs, rubs, or gallops. Distal pulses are 2+ and symmetric  Pulmonary/Chest: No respiratory distress. Clear to auscultation bilaterally. No wheezing or rales.  Equal chest wall rise bilaterally  Abdominal: Soft and non-distended.  There is no tenderness.  No rebound, guarding, or rigidity. Good bowel sounds.  Genitourinary: No CVA tenderness.  No suprapubic tenderness  Musculoskeletal: Moves all extremities. No obvious deformities.  5 x 5 strength in all extremities   Skin: Warm and dry.  Neurological:  Alert, awake, and appropriate.  Normal speech.  No acute focal neurological deficits are appreciated.  Two through 12 intact bilaterally.  Psychiatric: Normal affect. Good eye contact. Appropriate in content.     ED Course   Critical Care    Date/Time: 2/5/2023 12:04 PM  Performed by: George Us Jr., MD  Authorized by: George Us Jr., MD   Direct patient critical care time: 17 minutes  Additional history critical care time: 9 minutes  Ordering / reviewing critical care time: 8 minutes  Documentation critical care time: 7 minutes  Consulting other physicians critical care time: 6 minutes  Total critical care time (exclusive of procedural time) : 47 minutes  Critical care time was  exclusive of separately billable procedures and treating other patients and teaching time.  Critical care was necessary to treat or prevent imminent or life-threatening deterioration of the following conditions: HTN.  Critical care was time spent personally by me on the following activities: blood draw for specimens, development of treatment plan with patient or surrogate, discussions with consultants, interpretation of cardiac output measurements, evaluation of patient's response to treatment, examination of patient, obtaining history from patient or surrogate, ordering and review of laboratory studies, ordering and performing treatments and interventions, ordering and review of radiographic studies, pulse oximetry, re-evaluation of patient's condition and review of old charts.      ED Vital Signs:  Vitals:    02/05/23 0834 02/05/23 0838 02/05/23 0842 02/05/23 0845   BP: (!) 160/88   (!) 148/93   Pulse: 78   74   Resp: 18   20   Temp:   98.1 °F (36.7 °C)    TempSrc:   Oral    SpO2: 97%   95%   Weight:  98.8 kg (217 lb 14.4 oz)      02/05/23 0857 02/05/23 1005 02/05/23 1145 02/05/23 1230   BP:  (!) 168/96 (!) 226/148 131/89   Pulse: 74 75 90 102   Resp:  (!) 24 (!) 37 (!) 26   Temp:       TempSrc:       SpO2:  96% 96% (!) 94%   Weight:           Abnormal Lab Results:  Labs Reviewed   CBC W/ AUTO DIFFERENTIAL - Abnormal; Notable for the following components:       Result Value    RBC 4.19 (*)     Hemoglobin 13.4 (*)     Hematocrit 39.7 (*)     MCH 32.0 (*)     RDW 15.0 (*)     Platelets 110 (*)     Immature Granulocytes 0.6 (*)     Lymph % 17.9 (*)     All other components within normal limits   COMPREHENSIVE METABOLIC PANEL - Abnormal; Notable for the following components:    Chloride 94 (*)     BUN 29 (*)     Creatinine 1.8 (*)     Calcium 8.6 (*)     Total Bilirubin 1.1 (*)     AST 45 (*)     Anion Gap 19 (*)     eGFR 39 (*)     All other components within normal limits   TROPONIN I - Abnormal; Notable for the  following components:    Troponin I 0.028 (*)     All other components within normal limits   B-TYPE NATRIURETIC PEPTIDE - Abnormal; Notable for the following components:     (*)     All other components within normal limits   URINALYSIS, REFLEX TO URINE CULTURE - Abnormal; Notable for the following components:    Protein, UA 3+ (*)     Occult Blood UA 2+ (*)     Urobilinogen, UA 2.0-3.0 (*)     Leukocytes, UA Trace (*)     All other components within normal limits    Narrative:     Specimen Source->Urine   DRUG SCREEN PANEL, URINE EMERGENCY - Abnormal; Notable for the following components:    Opiate Scrn, Ur Presumptive Positive (*)     All other components within normal limits    Narrative:     Specimen Source->Urine   URINALYSIS MICROSCOPIC - Abnormal; Notable for the following components:    RBC, UA 13 (*)     WBC, UA 6 (*)     Hyaline Casts, UA 3 (*)     All other components within normal limits    Narrative:     Specimen Source->Urine   ALCOHOL,MEDICAL (ETHANOL)   AMMONIA        All Lab Results:  Results for orders placed or performed during the hospital encounter of 02/05/23   CBC auto differential   Result Value Ref Range    WBC 6.27 3.90 - 12.70 K/uL    RBC 4.19 (L) 4.60 - 6.20 M/uL    Hemoglobin 13.4 (L) 14.0 - 18.0 g/dL    Hematocrit 39.7 (L) 40.0 - 54.0 %    MCV 95 82 - 98 fL    MCH 32.0 (H) 27.0 - 31.0 pg    MCHC 33.8 32.0 - 36.0 g/dL    RDW 15.0 (H) 11.5 - 14.5 %    Platelets 110 (L) 150 - 450 K/uL    MPV 11.2 9.2 - 12.9 fL    Immature Granulocytes 0.6 (H) 0.0 - 0.5 %    Gran # (ANC) 4.3 1.8 - 7.7 K/uL    Immature Grans (Abs) 0.04 0.00 - 0.04 K/uL    Lymph # 1.1 1.0 - 4.8 K/uL    Mono # 0.8 0.3 - 1.0 K/uL    Eos # 0.0 0.0 - 0.5 K/uL    Baso # 0.03 0.00 - 0.20 K/uL    nRBC 0 0 /100 WBC    Gran % 68.2 38.0 - 73.0 %    Lymph % 17.9 (L) 18.0 - 48.0 %    Mono % 12.3 4.0 - 15.0 %    Eosinophil % 0.5 0.0 - 8.0 %    Basophil % 0.5 0.0 - 1.9 %    Differential Method Automated    Comprehensive metabolic  panel   Result Value Ref Range    Sodium 140 136 - 145 mmol/L    Potassium 3.9 3.5 - 5.1 mmol/L    Chloride 94 (L) 95 - 110 mmol/L    CO2 27 23 - 29 mmol/L    Glucose 96 70 - 110 mg/dL    BUN 29 (H) 8 - 23 mg/dL    Creatinine 1.8 (H) 0.5 - 1.4 mg/dL    Calcium 8.6 (L) 8.7 - 10.5 mg/dL    Total Protein 7.6 6.0 - 8.4 g/dL    Albumin 3.8 3.5 - 5.2 g/dL    Total Bilirubin 1.1 (H) 0.1 - 1.0 mg/dL    Alkaline Phosphatase 74 55 - 135 U/L    AST 45 (H) 10 - 40 U/L    ALT 34 10 - 44 U/L    Anion Gap 19 (H) 8 - 16 mmol/L    eGFR 39 (A) >60 mL/min/1.73 m^2   Troponin I #1   Result Value Ref Range    Troponin I 0.028 (H) 0.000 - 0.026 ng/mL   BNP   Result Value Ref Range     (H) 0 - 99 pg/mL   Urinalysis, Reflex to Urine Culture Urine, Clean Catch    Specimen: Urine   Result Value Ref Range    Specimen UA Urine, Clean Catch     Color, UA Yellow Yellow, Straw, Jelena    Appearance, UA Clear Clear    pH, UA 6.0 5.0 - 8.0    Specific Gravity, UA 1.020 1.005 - 1.030    Protein, UA 3+ (A) Negative    Glucose, UA Negative Negative    Ketones, UA Negative Negative    Bilirubin (UA) Negative Negative    Occult Blood UA 2+ (A) Negative    Nitrite, UA Negative Negative    Urobilinogen, UA 2.0-3.0 (A) <2.0 EU/dL    Leukocytes, UA Trace (A) Negative   Drug screen panel, emergency   Result Value Ref Range    Benzodiazepines Negative Negative    Methadone metabolites Negative Negative    Cocaine (Metab.) Negative Negative    Opiate Scrn, Ur Presumptive Positive (A) Negative    Barbiturate Screen, Ur Negative Negative    Amphetamine Screen, Ur Negative Negative    THC Negative Negative    Phencyclidine Negative Negative    Creatinine, Urine 214.3 23.0 - 375.0 mg/dL    Toxicology Information SEE COMMENT    Ethanol   Result Value Ref Range    Alcohol, Serum <10 <10 mg/dL   Ammonia   Result Value Ref Range    Ammonia 35 10 - 50 umol/L   Urinalysis Microscopic   Result Value Ref Range    RBC, UA 13 (H) 0 - 4 /hpf    WBC, UA 6 (H) 0 - 5 /hpf     WBC Clumps, UA None None-Rare    Bacteria Rare None-Occ /hpf    Hyaline Casts, UA 3 (A) 0-1/lpf /lpf    Unclass Bonita UA Rare None-Moderate    Microscopic Comment SEE COMMENT          Imaging Results:  Imaging Results              X-Ray Chest AP Portable (Final result)  Result time 02/05/23 10:36:48      Final result by Luis Martinez MD (02/05/23 10:36:48)                   Impression:      No acute radiographic abnormality in the chest.      Electronically signed by: Luis Martinez  Date:    02/05/2023  Time:    10:36               Narrative:    EXAMINATION:  XR CHEST AP PORTABLE    CLINICAL HISTORY:  Chest Pain;    TECHNIQUE:  Single frontal view of the chest was performed.    COMPARISON:  Chest radiograph 04/23/2022    FINDINGS:  Cardiac leads project over the chest.  Cardiomediastinal silhouette is unchanged in size.  Trachea is midline.  Lungs appear symmetrically expanded.  No focal infiltrate or consolidation.  No large pleural no pneumothorax.  The visualized osseous structures appear intact.                                       CT Head Without Contrast (Final result)  Result time 02/05/23 09:46:02      Final result by Luis Martinez MD (02/05/23 09:46:02)                   Impression:      Motion limited examination demonstrates no CT evidence of an acute intracranial abnormality.    All CT scans at this facility use dose modulation, iterative reconstruction, and/or weight base dosing when appropriate to reduce radiation dose to as low as reasonably achievable.      Electronically signed by: Luis Martinez  Date:    02/05/2023  Time:    09:46               Narrative:    EXAMINATION:  CT HEAD WITHOUT CONTRAST    CLINICAL HISTORY:  Mental status change, unknown cause;    TECHNIQUE:  Contiguous axial images were obtained from the skull base through the vertex without intravenous contrast.    COMPARISON:  Head CT 04/23/2022    FINDINGS:  Examination is limited by patient motion artifact.  No evidence  of acute intracranial hemorrhage.  No mass effect or midline shift. Normal parenchymal attenuation. The ventricles and sulci are normal in size and configuration. The paranasal sinuses and mastoid air cells are clear.  No concerning osseous findings.                                       CT Cervical Spine Without Contrast (Final result)  Result time 02/05/23 09:55:25      Final result by Luis Martinez MD (02/05/23 09:55:25)                   Impression:      No CT evidence of acute cervical spine fracture or subluxation.    Significant multilevel degenerative changes of the cervical spine as discussed in the body of the report.    3.8 mm pulmonary nodule.  Per Fleischner Society criteria guidelines, in a low risk patient, no chest CT follow-up is warranted.  In a high risk patient, follow-up chest CT in 12 months is optional.    All CT scans at this facility use dose modulation, iterative reconstruction, and/or weight based dosing when appropriate to reduce radiation dose to as low as reasonable achievable.      Electronically signed by: Luis Martinez  Date:    02/05/2023  Time:    09:55               Narrative:    EXAMINATION:  CT CERVICAL SPINE WITHOUT CONTRAST    CLINICAL HISTORY:  Neck pain, acute, no red flags;    TECHNIQUE:  Low dose axial images, sagittal and coronal reformations were performed though the cervical spine.  Contrast was not administered.    COMPARISON:  None.    FINDINGS:  Skull Base and Craniocervical Junction (partially imaged): Within normal limits.    Spinal Alignment: Straightening of the normal cervical lordosis.  Minor C6-C7 anterolisthesis.    Vertebrae: Cervical vertebral body heights are maintained.  There are large bridging anterior osteophytes at C4-C5 and C5-C6.  No acute fracture.    Discs: Mild disc height loss throughout the cervical spine most pronounced C4-C5.    Degenerative findings: Multilevel broad-based disc osteophyte complexes contributing to multilevel spinal  canal stenosis most pronounced at C4-C5 with moderate spinal canal stenosis.  Additional multilevel mild-to-moderate neural foraminal stenosis at C3-C4 and C5-C6.  Multilevel facet arthropathy and uncovertebral joint spurring.  Severe left-sided facet arthropathy at C2-C3 and C3-C4 with severe right-sided facet arthropathy at C6-C7.  There is multilevel moderate to severe neural foraminal stenosis.    Paraspinal muscles & soft tissues: Osteophytes likely demonstrate prominent mass effect on the proximal cervical esophagus.  There are bilateral carotid bifurcation atherosclerotic changes.    Visualized lung apices: 3.8 mm pulmonary nodule in likely the superior segment right lower lobe.                                       The EKG was ordered, reviewed, and independently interpreted by the ED provider.  Interpretation time: 8:51  Rate: 73 BPM  Rhythm:  Sinus rhythm with occasional premature ventricular complexes and fusion complexes   Interpretation: Left axis deviation. Low voltage QRS. Incomplete right bundle branch block. Inferior infarct ,age undetermined. Prolonged QT. No STEMI.           The Emergency Provider reviewed the vital signs and test results, which are outlined above.     ED Discussion     1:00 PM: Discussed case with Dr. Mccall ([facility]) who agrees with current care and management of pt and accepts admission.   Admitting Service: [facility]   Admitting Physician: Dr. Mccall  Admit to: Obs    1:00 PM: Re-evaluated pt. I have discussed test results, shared treatment plan, and the need for admission with patient and family at bedside. Pt and family express understanding at this time and agree with all information. All questions answered. Pt and family have no further questions or concerns at this time. Pt is ready for admit.         Medical Decision Making:   History:   I obtained history from: someone other than patient.       <> Summary of History: History was obtained from his wife.   Patient is altered unable to give a good coherent history.  Old Medical Records: I decided to obtain old medical records.  Old Records Summarized: records from previous admission(s).       <> Summary of Records: I reviewed the patient's chart from April 2022 where he had a similar presentation and was discharged home.  I reviewed the patient's  and note that the patient is on several opioids.  Clinical Tests:   Lab Tests: Ordered and Reviewed  Radiological Study: Ordered and Reviewed  Medical Tests: Ordered and Reviewed  Other:   I have discussed this case with another health care provider.       <> Summary of the Discussion: I discussed the case with Dr. Mccall and Hospital Medicine at length.  I discussed all findings.  She is aware the patient's condition and agrees to evaluate and admit.  Patient presents with confusion.  He is in pain management and his wife provides his medications for him.  She seems to be a bit confused as to how to dose these.  Patient is altered and seems to be hallucinating.  I ordered multiple labs.  His urine is positive for opiates but no infection.  His alcohol ammonia are normal.  His troponin is elevated at baseline 0.028, his BNP is at baseline at 161 his creatinine is at baseline at 1.8.  He is not uremic.  Has a normal white count.  There is no shift.  I ordered a CT cervical spine and CT head and reviewed both.  I agree with the radiologist read that there is no spinal fracture or evidence of intracranial issue.  I also ordered a chest x-ray which I have visualized and read as no pneumonia.  I ordered and independently interpreted his EKG as well.  The patient's continued altered consciousness I consulted Hospital Medicine Dr. Mccall who is admitting the patient for observation.  Patient is aware.         ED Medication(s):  Medications   0.9%  NaCl infusion (has no administration in time range)   carvediloL tablet 6.25 mg (has no administration in time range)    sacubitriL-valsartan 24-26 mg per tablet 1 tablet (has no administration in time range)   furosemide tablet 40 mg (has no administration in time range)   sodium chloride 0.9% flush 10 mL (has no administration in time range)   enoxaparin injection 40 mg (has no administration in time range)   acetaminophen tablet 650 mg (has no administration in time range)   HYDROcodone-acetaminophen 5-325 mg per tablet 1 tablet (has no administration in time range)   acetaminophen tablet 650 mg (has no administration in time range)   aspirin tablet 325 mg (325 mg Oral Given 2/5/23 0901)   hydrALAZINE injection 10 mg (10 mg Intravenous Given 2/5/23 1206)   LORazepam injection 1 mg (1 mg Intravenous Given 2/5/23 1259)       New Prescriptions    No medications on file               Scribe Attestation:   Scribe #1: I performed the above scribed service and the documentation accurately describes the services I performed. I attest to the accuracy of the note.     Attending:   Physician Attestation Statement for Scribe #1: I, George Us Jr., MD, personally performed the services described in this documentation, as scribed by Aida Lau, in my presence, and it is both accurate and complete.           Clinical Impression       ICD-10-CM ICD-9-CM   1. Altered mental status, unspecified altered mental status type  R41.82 780.97   2. Chest pain  R07.9 786.50       Disposition:   Disposition: Placed in Observation  Condition: Serious       George Us Jr., MD  02/05/23 8366

## 2023-02-05 NOTE — ASSESSMENT & PLAN NOTE
Patient with acute kidney injury likely due to IVVD/dehydration DEX is currently stable. Labs reviewed- Renal function/electrolytes with Estimated Creatinine Clearance: 44.6 mL/min (A) (based on SCr of 1.8 mg/dL (H)). according to latest data. Monitor urine output and serial BMP and adjust therapy as needed. Avoid nephrotoxins and renally dose meds for GFR listed above.   - start IVF and repeat labs in am

## 2023-02-05 NOTE — PHARMACY MED REC
"Admission Medication History     The home medication history was taken by Fred Hardy.    You may go to "Admission" then "Reconcile Home Medications" tabs to review and/or act upon these items.     The home medication list has been updated by the Pharmacy department.   Please read ALL comments highlighted in yellow.   Please address this information as you see fit.    Feel free to contact us if you have any questions or require assistance.      Medications listed below were obtained from: Analytic software- [employer] and Medical records  (Not in a hospital admission)      Fred Hardy  [id number]    Current Outpatient Medications on File Prior to Encounter   Medication Sig Dispense Refill Last Dose    baclofen (LIORESAL) 10 MG tablet Take 10 mg by mouth 3 (three) times daily as needed.       carvediloL (COREG) 12.5 MG tablet Take 6.25 mg by mouth 2 (two) times daily.       furosemide (LASIX) 40 MG tablet Take 40 mg by mouth once daily.       gabapentin (NEURONTIN) 600 MG tablet Take 600 mg by mouth 3 (three) times daily as needed.       morphine (MS CONTIN) 30 MG 12 hr tablet SMARTSI Tablet(s) By Mouth Every 12 Hours PRN       ondansetron (ZOFRAN-ODT) 4 MG TbDL Take 4 mg by mouth every 8 (eight) hours as needed.       oxyCODONE (ROXICODONE) 10 mg Tab immediate release tablet Take 10 mg by mouth every 8 (eight) hours as needed.       sacubitriL-valsartan (ENTRESTO) 24-26 mg per tablet Take 1 tablet by mouth 2 (two) times daily.                              .        "

## 2023-02-05 NOTE — ASSESSMENT & PLAN NOTE
Patient is identified as having heart failure that is Chronic. CHF is currently controlled. Latest ECHO performed and demonstrates- No results found for this or any previous visit.  . Continue Beta Blocker and Furosemide and monitor clinical status closely. Monitor on telemetry. Patient is off CHF pathway.  Monitor strict Is&Os and daily weights.  Place on fluid restriction of 1.5 L. Continue to stress to patient importance of self efficacy and  on diet for CHF. Last BNP reviewed- and noted below   Recent Labs   Lab 02/05/23  0928   *   .  - Echo pending  - continue home lasix and entresto

## 2023-02-05 NOTE — HPI
"Mr. Miller is a 72 year old male with a history of chronic neck/back pain followed by Dr. CHRISTOPHE Mccall and HTN who presents to ed with wife d/t confusion that has progressively gotten worse over the last two days.  Wife states the patient is "acting like he is hallucinating."  Wife does report that he patient was recently started on sin for breakthrough pain in addition to his scheduled MS contin and baclofen.  Symptoms are constant and moderate in severity. No mitigating or exacerbating factors reported.  In the ed lab work notable for platelets 110, Creatinine 1.8, bili 1.1, Trop 0.02 and .  UDS positive for opiates. UA with no signs of infection.  Chest xray with no acute processes.  CT C-spine with no acute cervical spine fracture.  CT head negative for acute intracranial abnormality.   In the ed, patient became agitated and aggressive and was give 1 mg IV ativan.  At assessment, patient drowsy from medication, opens eyes to painful stimuli, wife at bedside.  Patient will be admitted to observation for continued work-up of AMS.     Code Status Full  Surrogate decision maker wife Deedee    "

## 2023-02-05 NOTE — ASSESSMENT & PLAN NOTE
- PMNR: Dr. TOM Mccall  - Patient is on MS contin and Oxycodone  - would DC oxycode on discharge and recommend follow up with Dr. TOM Mccall.

## 2023-02-05 NOTE — ASSESSMENT & PLAN NOTE
- ? Etiology  - Patient is on MS Contin and recently started on oxycodone for break through pain by Dr[facility]  - Hold all home meds, will place patient on PRN norco 5 to prevent withdrawal  - CT head negative  - Consider MRI if no improvement in mental status.  - of note, he was given one dose of ativan due to combative behavior.  Patient's wife at bedside states she gives him his medication, but states she doesn't think he got more than usual but is unsure.  She appeared to be confused about his medications as well.  Recommend NP at home program upon discharge.

## 2023-02-05 NOTE — FIRST PROVIDER EVALUATION
Medical screening examination initiated.  I have conducted a focused provider triage encounter, findings are as follows:    Brief history of present illness:  Neck shoulder and chest pain since yesterday.  When wife arrived she reports the patient has had slurred speech and altered mental status for 2-3 days    Vitals:    02/05/23 0834 02/05/23 0838 02/05/23 0842 02/05/23 0845   BP: (!) 160/88   (!) 148/93   Pulse: 78   74   Resp: 18   20   Temp:   98.1 °F (36.7 °C)    TempSrc:   Oral    SpO2: 97%   95%   Weight:  98.8 kg (217 lb 14.4 oz)         Pertinent physical exam:  Uncomfortable    Brief workup plan:  Cardiac workup    Preliminary workup initiated; this workup will be continued and followed by the physician or advanced practice provider that is assigned to the patient when roomed.

## 2023-02-06 PROBLEM — G93.41 ENCEPHALOPATHY, METABOLIC: Status: ACTIVE | Noted: 2023-02-05

## 2023-02-06 PROBLEM — F10.931 ALCOHOL WITHDRAWAL SYNDROME, WITH DELIRIUM: Status: ACTIVE | Noted: 2023-02-06

## 2023-02-06 LAB
ALBUMIN SERPL BCP-MCNC: 3 G/DL (ref 3.5–5.2)
ALP SERPL-CCNC: 57 U/L (ref 55–135)
ALT SERPL W/O P-5'-P-CCNC: 30 U/L (ref 10–44)
ANION GAP SERPL CALC-SCNC: 14 MMOL/L (ref 8–16)
AST SERPL-CCNC: 38 U/L (ref 10–40)
BASOPHILS # BLD AUTO: 0.02 K/UL (ref 0–0.2)
BASOPHILS NFR BLD: 0.6 % (ref 0–1.9)
BILIRUB SERPL-MCNC: 1 MG/DL (ref 0.1–1)
BUN SERPL-MCNC: 29 MG/DL (ref 8–23)
CALCIUM SERPL-MCNC: 7.4 MG/DL (ref 8.7–10.5)
CHLORIDE SERPL-SCNC: 99 MMOL/L (ref 95–110)
CO2 SERPL-SCNC: 29 MMOL/L (ref 23–29)
CREAT SERPL-MCNC: 1.1 MG/DL (ref 0.5–1.4)
DIFFERENTIAL METHOD: ABNORMAL
EOSINOPHIL # BLD AUTO: 0 K/UL (ref 0–0.5)
EOSINOPHIL NFR BLD: 1.1 % (ref 0–8)
ERYTHROCYTE [DISTWIDTH] IN BLOOD BY AUTOMATED COUNT: 15.4 % (ref 11.5–14.5)
EST. GFR  (NO RACE VARIABLE): >60 ML/MIN/1.73 M^2
ETHANOL UR-MCNC: <10 MG/DL
GLUCOSE SERPL-MCNC: 92 MG/DL (ref 70–110)
HCT VFR BLD AUTO: 36 % (ref 40–54)
HGB BLD-MCNC: 12 G/DL (ref 14–18)
IMM GRANULOCYTES # BLD AUTO: 0.04 K/UL (ref 0–0.04)
IMM GRANULOCYTES NFR BLD AUTO: 1.1 % (ref 0–0.5)
LYMPHOCYTES # BLD AUTO: 0.9 K/UL (ref 1–4.8)
LYMPHOCYTES NFR BLD: 25.6 % (ref 18–48)
MCH RBC QN AUTO: 32.2 PG (ref 27–31)
MCHC RBC AUTO-ENTMCNC: 33.3 G/DL (ref 32–36)
MCV RBC AUTO: 97 FL (ref 82–98)
MONOCYTES # BLD AUTO: 0.4 K/UL (ref 0.3–1)
MONOCYTES NFR BLD: 12.2 % (ref 4–15)
NEUTROPHILS # BLD AUTO: 2.1 K/UL (ref 1.8–7.7)
NEUTROPHILS NFR BLD: 59.4 % (ref 38–73)
NRBC BLD-RTO: 0 /100 WBC
PLATELET # BLD AUTO: 93 K/UL (ref 150–450)
PMV BLD AUTO: 12.1 FL (ref 9.2–12.9)
POTASSIUM SERPL-SCNC: 3 MMOL/L (ref 3.5–5.1)
PROT SERPL-MCNC: 6.2 G/DL (ref 6–8.4)
RBC # BLD AUTO: 3.73 M/UL (ref 4.6–6.2)
SODIUM SERPL-SCNC: 142 MMOL/L (ref 136–145)
WBC # BLD AUTO: 3.6 K/UL (ref 3.9–12.7)

## 2023-02-06 PROCEDURE — 85025 COMPLETE CBC W/AUTO DIFF WBC: CPT | Performed by: NURSE PRACTITIONER

## 2023-02-06 PROCEDURE — 21400001 HC TELEMETRY ROOM

## 2023-02-06 PROCEDURE — 36415 COLL VENOUS BLD VENIPUNCTURE: CPT | Performed by: NURSE PRACTITIONER

## 2023-02-06 PROCEDURE — 11000001 HC ACUTE MED/SURG PRIVATE ROOM

## 2023-02-06 PROCEDURE — 99900035 HC TECH TIME PER 15 MIN (STAT)

## 2023-02-06 PROCEDURE — 63600175 PHARM REV CODE 636 W HCPCS: Performed by: NURSE PRACTITIONER

## 2023-02-06 PROCEDURE — 96372 THER/PROPH/DIAG INJ SC/IM: CPT

## 2023-02-06 PROCEDURE — 25000003 PHARM REV CODE 250: Performed by: FAMILY MEDICINE

## 2023-02-06 PROCEDURE — 63600175 PHARM REV CODE 636 W HCPCS: Performed by: FAMILY MEDICINE

## 2023-02-06 PROCEDURE — 96366 THER/PROPH/DIAG IV INF ADDON: CPT

## 2023-02-06 PROCEDURE — 25000003 PHARM REV CODE 250: Performed by: NURSE PRACTITIONER

## 2023-02-06 PROCEDURE — 94761 N-INVAS EAR/PLS OXIMETRY MLT: CPT

## 2023-02-06 PROCEDURE — 27000221 HC OXYGEN, UP TO 24 HOURS

## 2023-02-06 PROCEDURE — 80053 COMPREHEN METABOLIC PANEL: CPT | Performed by: NURSE PRACTITIONER

## 2023-02-06 RX ORDER — CLONIDINE HYDROCHLORIDE 0.1 MG/1
0.1 TABLET ORAL EVERY 6 HOURS PRN
Status: DISCONTINUED | OUTPATIENT
Start: 2023-02-06 | End: 2023-02-07 | Stop reason: HOSPADM

## 2023-02-06 RX ORDER — CHLORDIAZEPOXIDE HYDROCHLORIDE 25 MG/1
25 CAPSULE, GELATIN COATED ORAL 3 TIMES DAILY
Status: DISCONTINUED | OUTPATIENT
Start: 2023-02-06 | End: 2023-02-07 | Stop reason: HOSPADM

## 2023-02-06 RX ORDER — PNV NO.95/FERROUS FUM/FOLIC AC 28MG-0.8MG
100 TABLET ORAL DAILY
Status: DISCONTINUED | OUTPATIENT
Start: 2023-02-06 | End: 2023-02-07 | Stop reason: HOSPADM

## 2023-02-06 RX ORDER — HYDRALAZINE HYDROCHLORIDE 20 MG/ML
10 INJECTION INTRAMUSCULAR; INTRAVENOUS EVERY 6 HOURS PRN
Status: DISCONTINUED | OUTPATIENT
Start: 2023-02-06 | End: 2023-02-07 | Stop reason: HOSPADM

## 2023-02-06 RX ORDER — FOLIC ACID 1 MG/1
1 TABLET ORAL DAILY
Status: DISCONTINUED | OUTPATIENT
Start: 2023-02-06 | End: 2023-02-07 | Stop reason: HOSPADM

## 2023-02-06 RX ORDER — POTASSIUM CHLORIDE 20 MEQ/1
40 TABLET, EXTENDED RELEASE ORAL
Status: COMPLETED | OUTPATIENT
Start: 2023-02-06 | End: 2023-02-06

## 2023-02-06 RX ADMIN — CARVEDILOL 6.25 MG: 6.25 TABLET, FILM COATED ORAL at 09:02

## 2023-02-06 RX ADMIN — LORAZEPAM 1 MG: 2 INJECTION INTRAMUSCULAR; INTRAVENOUS at 01:02

## 2023-02-06 RX ADMIN — SACUBITRIL AND VALSARTAN 1 TABLET: 24; 26 TABLET, FILM COATED ORAL at 09:02

## 2023-02-06 RX ADMIN — SODIUM CHLORIDE: 9 INJECTION, SOLUTION INTRAVENOUS at 09:02

## 2023-02-06 RX ADMIN — HYDROCODONE BITARTRATE AND ACETAMINOPHEN 1 TABLET: 5; 325 TABLET ORAL at 02:02

## 2023-02-06 RX ADMIN — POTASSIUM CHLORIDE 40 MEQ: 1500 TABLET, EXTENDED RELEASE ORAL at 03:02

## 2023-02-06 RX ADMIN — CHLORDIAZEPOXIDE HYDROCHLORIDE 25 MG: 25 CAPSULE ORAL at 10:02

## 2023-02-06 RX ADMIN — HYDROCODONE BITARTRATE AND ACETAMINOPHEN 1 TABLET: 5; 325 TABLET ORAL at 11:02

## 2023-02-06 RX ADMIN — POTASSIUM CHLORIDE 40 MEQ: 1500 TABLET, EXTENDED RELEASE ORAL at 06:02

## 2023-02-06 RX ADMIN — HYDROCODONE BITARTRATE AND ACETAMINOPHEN 1 TABLET: 5; 325 TABLET ORAL at 09:02

## 2023-02-06 RX ADMIN — FOLIC ACID 1 MG: 1 TABLET ORAL at 10:02

## 2023-02-06 RX ADMIN — THIAMINE HYDROCHLORIDE 500 MG: 100 INJECTION, SOLUTION INTRAMUSCULAR; INTRAVENOUS at 03:02

## 2023-02-06 RX ADMIN — FUROSEMIDE 40 MG: 40 TABLET ORAL at 09:02

## 2023-02-06 RX ADMIN — CHLORDIAZEPOXIDE HYDROCHLORIDE 25 MG: 25 CAPSULE ORAL at 03:02

## 2023-02-06 RX ADMIN — THIAMINE HYDROCHLORIDE 500 MG: 100 INJECTION, SOLUTION INTRAMUSCULAR; INTRAVENOUS at 11:02

## 2023-02-06 RX ADMIN — FOLIC ACID: 5 INJECTION, SOLUTION INTRAMUSCULAR; INTRAVENOUS; SUBCUTANEOUS at 02:02

## 2023-02-06 RX ADMIN — Medication 100 MCG: at 10:02

## 2023-02-06 RX ADMIN — ENOXAPARIN SODIUM 40 MG: 40 INJECTION SUBCUTANEOUS at 06:02

## 2023-02-06 RX ADMIN — THIAMINE HYDROCHLORIDE 500 MG: 100 INJECTION, SOLUTION INTRAMUSCULAR; INTRAVENOUS at 10:02

## 2023-02-06 RX ADMIN — CHLORDIAZEPOXIDE HYDROCHLORIDE 25 MG: 25 CAPSULE ORAL at 09:02

## 2023-02-06 NOTE — PROGRESS NOTES
"[facility] Medicine  Progress Note    Patient Name: Ramez Miller  MRN: [id number]  Patient Class: OP- Observation   Admission Date: 2/5/2023  Length of Stay: 0 days  Attending Physician: Aman Navarrete, *  Primary Care Provider: Primary Doctor No        Subjective:     Principal Problem:Encephalopathy, metabolic        HPI:  Mr. Miller is a 72 year old male with a history of chronic neck/back pain followed by Dr. CHRISTOPHE Mccall and HTN who presents to ed with wife d/t confusion that has progressively gotten worse over the last two days.  Wife states the patient is "acting like he is hallucinating."  Wife does report that he patient was recently started on sin for breakthrough pain in addition to his scheduled MS contin and baclofen.  Symptoms are constant and moderate in severity. No mitigating or exacerbating factors reported.  In the ed lab work notable for platelets 110, Creatinine 1.8, bili 1.1, Trop 0.02 and .  UDS positive for opiates. UA with no signs of infection.  Chest xray with no acute processes.  CT C-spine with no acute cervical spine fracture.  CT head negative for acute intracranial abnormality.   In the ed, patient became agitated and aggressive and was give 1 mg IV ativan.  At assessment, patient drowsy from medication, opens eyes to painful stimuli, wife at bedside.  Patient will be admitted to observation for continued work-up of AMS.     Code Status Full  Surrogate decision maker wife Deedee        Overview/Hospital Course:  72-year-old male, in the [facility] hospital medicine for altered mental status, ETOH withdrawal.  Patient hypertensive overnight, tremors.  Ordered Librium t.i.d., thiamine repletion.  K +:  3.0, repleted, will repeat lab in the morning.  Family at bedside, concerned about abdominal distension, ordered ultrasound: Negative for ascites.  Patient continues with mild confusion, agitation.  Possible discharge in a.m..      Interval History:  " Patient has extensive ETOH abuse history.  Exhibiting symptoms of withdrawal to include: Tremors, agitation, confusion.  Initiated Librium, CIWA scale.     Review of Systems   Constitutional:  Positive for activity change, appetite change and fatigue. Negative for chills, diaphoresis, fever and unexpected weight change.   HENT:  Negative for congestion, hearing loss, nosebleeds, postnasal drip, rhinorrhea and trouble swallowing.    Eyes:  Negative for discharge and visual disturbance.   Respiratory:  Negative for cough, chest tightness and shortness of breath.    Cardiovascular:  Negative for chest pain, palpitations and leg swelling.   Gastrointestinal:  Positive for abdominal distention, abdominal pain and nausea. Negative for constipation, diarrhea and vomiting.   Endocrine: Negative for cold intolerance and heat intolerance.   Genitourinary:  Negative for difficulty urinating, dysuria, frequency and hematuria.   Musculoskeletal:  Positive for arthralgias and back pain. Negative for gait problem and myalgias.   Skin: Negative.    Neurological:  Positive for tremors, weakness and headaches. Negative for dizziness and light-headedness.   Hematological:  Negative for adenopathy. Does not bruise/bleed easily.   Psychiatric/Behavioral:  Positive for agitation and confusion. Negative for behavioral problems. The patient is nervous/anxious.    Objective:     Vital Signs (Most Recent):  Temp: 97.7 °F (36.5 °C) (02/06/23 1131)  Pulse: 66 (02/06/23 1303)  Resp: 17 (02/06/23 1424)  BP: (!) 152/70 (02/06/23 1131)  SpO2: 95 % (02/06/23 1131)   Vital Signs (24h Range):  Temp:  [96 °F (35.6 °C)-98.2 °F (36.8 °C)] 97.7 °F (36.5 °C)  Pulse:  [] 66  Resp:  [16-22] 17  SpO2:  [95 %-100 %] 95 %  BP: (145-182)/(70-94) 152/70     Weight: 94.4 kg (208 lb 1.8 oz)  Body mass index is 27.46 kg/m².    Intake/Output Summary (Last 24 hours) at 2/6/2023 1424  Last data filed at 2/6/2023 0902  Gross per 24 hour   Intake 1942.09 ml    Output --   Net 1942.09 ml      Physical Exam  Vitals and nursing note reviewed.   Constitutional:       General: He is not in acute distress.     Appearance: He is well-developed. He is ill-appearing. He is not diaphoretic.   HENT:      Head: Normocephalic and atraumatic.      Right Ear: Hearing and external ear normal.      Left Ear: Hearing and external ear normal.      Nose: Nose normal. No mucosal edema or rhinorrhea.      Mouth/Throat:      Pharynx: Uvula midline.   Eyes:      General:         Right eye: No discharge.         Left eye: No discharge.      Conjunctiva/sclera: Conjunctivae normal.      Right eye: No chemosis.     Left eye: No chemosis.     Pupils: Pupils are equal, round, and reactive to light.   Neck:      Thyroid: No thyroid mass or thyromegaly.      Trachea: Trachea normal.   Cardiovascular:      Rate and Rhythm: Normal rate and regular rhythm.      Pulses:           Dorsalis pedis pulses are 2+ on the right side and 2+ on the left side.      Heart sounds: Normal heart sounds. No murmur heard.  Pulmonary:      Effort: Pulmonary effort is normal. No respiratory distress.      Breath sounds: Normal breath sounds. No decreased breath sounds or wheezing.   Abdominal:      General: Bowel sounds are increased. There is distension.      Palpations: Abdomen is soft.      Tenderness: There is abdominal tenderness.   Musculoskeletal:         General: Normal range of motion.      Cervical back: Normal range of motion and neck supple.   Lymphadenopathy:      Cervical: No cervical adenopathy.      Upper Body:      Right upper body: No supraclavicular adenopathy.      Left upper body: No supraclavicular adenopathy.   Skin:     General: Skin is warm and dry.      Capillary Refill: Capillary refill takes less than 2 seconds.      Findings: No rash.   Neurological:      Mental Status: He is lethargic and disoriented.      Motor: Tremor (Fine tremors, bilateral upper extremity) present.   Psychiatric:          Cognition and Memory: He exhibits impaired recent memory and impaired remote memory.       Significant Labs: All pertinent labs within the past 24 hours have been reviewed.  CBC:   Recent Labs   Lab 02/05/23  0928 02/06/23  0932   WBC 6.27 3.60*   HGB 13.4* 12.0*   HCT 39.7* 36.0*   * 93*     CMP:   Recent Labs   Lab 02/05/23  0928 02/06/23  0932    142   K 3.9 3.0*   CL 94* 99   CO2 27 29   GLU 96 92   BUN 29* 29*   CREATININE 1.8* 1.1   CALCIUM 8.6* 7.4*   PROT 7.6 6.2   ALBUMIN 3.8 3.0*   BILITOT 1.1* 1.0   ALKPHOS 74 57   AST 45* 38   ALT 34 30   ANIONGAP 19* 14       Significant Imaging: I have reviewed all pertinent imaging results/findings within the past 24 hours.      Assessment/Plan:      * Encephalopathy, metabolic  - ? Etiology:  Likely secondary to ETOH abuse  - Patient is on MS Contin and recently started on oxycodone for break through pain [facility]  - Hold all home meds, will place patient on PRN norco 5 to prevent withdrawal  - CT head negative  - Consider MRI if no improvement in mental status:  Overall improvement, hold on MRI  - of note, he was given one dose of ativan due to combative behavior.  Patient's wife at bedside states she gives him his medication, but states she doesn't think he got more than usual but is unsure.  She appeared to be confused about his medications as well.  Recommend NP at home program upon discharge.  - initiate Librium, CIWA      Alcohol withdrawal syndrome, with delirium  Extensive history of alcohol abuse    --CIWA  --Librium t.i.d.  --Ativan p.r.n.  --Thiamine IV  --folic acid, B12      Thrombocytopenia  - likely secondary to ETOH abuse    --daily CBC, CMP  --Transfuse platelets for platelet count <10K or <50K for bleeding or procedures        DEX (acute kidney injury)  Patient with acute kidney injury likely due to IVVD/dehydration DEX is currently stable. Labs reviewed- Renal function/electrolytes with Estimated Creatinine Clearance:  66.6 mL/min (based on SCr of 1.1 mg/dL). according to latest data. Monitor urine output and serial BMP and adjust therapy as needed. Avoid nephrotoxins and renally dose meds for GFR listed above.   - start IVF and repeat labs in am         CHF (congestive heart failure)  Patient is identified as having heart failure that is Chronic. CHF is currently controlled. Latest ECHO performed and demonstrates- No results found for this or any previous visit.  . Continue Beta Blocker and Furosemide and monitor clinical status closely. Monitor on telemetry. Patient is off CHF pathway.  Monitor strict Is&Os and daily weights.  Place on fluid restriction of 1.5 L. Continue to stress to patient importance of self efficacy and  on diet for CHF. Last BNP reviewed- and noted below   Recent Labs   Lab 02/05/23  0928   *   .  - Echo pending  - continue home lasix and entresto      Hypertension  - Continue home meds once awake  - Hydralazine PRN      Chronic pain  - PMNR: Dr. TOM Mccall  - Patient is on MS contin and Oxycodone  - would DC oxycode on discharge and recommend follow up with Dr. TOM Mccall.      VTE Risk Mitigation (From admission, onward)         Ordered     enoxaparin injection 40 mg  Daily         02/05/23 1309     IP VTE HIGH RISK PATIENT  Once         02/05/23 1309     Place sequential compression device  Until discontinued         02/05/23 1309                Discharge Planning   MITRA:      Code Status: Full Code   Is the patient medically ready for discharge?:     Reason for patient still in hospital (select all that apply): Patient trending condition  Discharge Plan A: Home Health                  Cristal Gaitan NP  Department of Hospital Medicine   [facility]

## 2023-02-06 NOTE — PLAN OF CARE
[facility]  Initial Discharge Assessment       Primary Care Provider: Primary Doctor No    Admission Diagnosis: CHF (congestive heart failure) [I50.9]  Chest pain [R07.9]  Altered mental status, unspecified altered mental status type [R41.82]    Admission Date: 2/5/2023  Expected Discharge Date:     Discharge Barriers Identified: None    Payor: MEDICARE / Plan: MEDICARE PART A & B / Product Type: Government /     Extended Emergency Contact Information  Primary Emergency Contact: JOSE [address]  Mobile Phone: [phone]  Relation: Daughter  Preferred language: English   needed? No  Secondary Emergency Contact: Deedee Miller  Mobile Phone: [phone]  Relation: Spouse    Discharge Plan A: Home Health       No Pharmacies Listed    Initial Assessment (most recent)       Adult Discharge Assessment - 02/06/23 1151          Discharge Assessment    Assessment Type Discharge Planning Assessment     Confirmed/corrected address, phone number and insurance Yes     Confirmed Demographics Correct on Facesheet     Source of Information patient;family     Communicated MITRA with patient/caregiver Date not available/Unable to determine     Reason For Admission CHF     People in Home spouse     Facility Arrived From: home     Do you expect to return to your current living situation? Yes     Do you have help at home or someone to help you manage your care at home? Yes     Who are your caregiver(s) and their phone number(s)? spouse     Prior to hospitilization cognitive status: Alert/Oriented     Current cognitive status: Alert/Oriented     Home Accessibility wheelchair accessible     Equipment Currently Used at Home wheelchair;bedside commode     Readmission within 30 days? No     Patient currently being followed by outpatient case management? No     Do you currently have service(s) that help you manage your care at home? Yes     Name and Contact number of agency [facility]     Is the pt/caregiver preference to  resume services with current agency Yes     Do you take prescription medications? Yes     Do you have prescription coverage? Yes     Do you have any problems affording any of your prescribed medications? TBD     Is the patient taking medications as prescribed? yes     Who is going to help you get home at discharge? daughter, Juju     How do you get to doctors appointments? family or friend will provide     Are you on dialysis? No     Discharge Plan A Home Health     DME Needed Upon Discharge  none     Discharge Plan discussed with: Patient;Adult children     Discharge Barriers Identified None                      Anticipated DC Dispo: home with resumption of  [facility]  Prior Level of Function: primarily w/c bound, able to transfer independently. Patient lives with spouse  PCP: Jyoti Patel NP (Patient has appt Thursday next week and prefers to keep as is)  Comments:  CM met with patient/daughter at bedside to introduce role and discuss d/c planning. Patient lives with spouse and prefers to continue [facility] upon d/c. Daughter at bedside (Juju) will provide transportation home. CM following.

## 2023-02-06 NOTE — CONSULTS
"  [facility]  Adult Nutrition  Consult Note    SUMMARY     Recommendations    Recommendation/Intervention:   1. Recommend pt recieves Isaac BID to assist with wound healing or per MD/NP orders.   2. Recommend Boost Plus TID   3. Recommend weekly weights    Goals:   1. Pt will consume and tolerate Isaac BID within 24-48 hrs  2. Pt will consume and tolerate Boost Plus by RD follow up  3. Pt will tolerate and consume > 75% EEN by RD follow up  Nutrition Goal Status: new  Communication of RD Recs: other (comment) (POC, sticky note)    Assessment and Plan    Nutrition Problem  Inadequate oral intake     Related to (etiology):   Self limitation of foods due to preference    Signs and Symptoms (as evidenced by):   Confusion, Alcoholism     Interventions(treatment strategy):  Recommendation/Intervention:   1. Recommend pt recieves Isaac BID to assist with wound healing or per MD/NP orders.   2. Recommend Boost Plus TID   3. Recommend weekly weights  4. Collaboration with medical care providers     Nutrition Diagnosis Status:   New    Malnutrition Assessment                                       Reason for Assessment    Reason For Assessment: consult  Diagnosis:  (Metabolic encephalopathy, HTN, CHF, DEX, Thrombocytopenia, Alcohol withdrawl syndrome (with delirium))  Interdisciplinary Rounds: did not attend  General Information Comments: 72 y.o. Male admitted for metabolic encepholapathy. Per NP note 2/6, "... Patient has extensive ETOH abuse history.  Exhibiting symptoms of withdrawal to include: Tremors, agitation, confusion." Reviewed chart: %PO meal intake: per RN secure note, "He ate all his clears for breakfast. For lunch he ate about 75%. Family stating that he is not eating much at home." Skin: WDL, except dry with partial tissue loss to medial coccyx; Ranjit Score: 17 (mild risk); Edema: None. Labs, meds, weights reviewed. Potassium 2.0 L, BUN 29 H, Ca 7.4 L, Albumin 3.0 L, eGFR 39!; Note Thiamin TID; Weight " "charted 2/6 208#. RD will continue to follow.  Nutrition Discharge Planning: Continue Cardiac diet as tolerated or per MD/NP orders.    Nutrition Risk Screen    Nutrition Risk Screen: no indicators present    Nutrition/Diet History         Anthropometrics    Temp: 98.4 °F (36.9 °C)  Height: 6' 1" (185.4 cm)  Height (inches): 73 in  Weight Method: Bed Scale  Weight: 94.4 kg (208 lb 1.8 oz)  Weight (lb): 208.12 lb  Ideal Body Weight (IBW), Male: 184 lb  % Ideal Body Weight, Male (lb): 113.11 %  BMI (Calculated): 27.5  BMI Grade: 25 - 29.9 - overweight     Wt Readings from Last 15 Encounters:   02/06/23 94.4 kg (208 lb 1.8 oz)   04/23/22 77.6 kg (171 lb 1.2 oz)   10/08/21 75 kg (165 lb 5.5 oz)       Lab/Procedures/Meds    Pertinent Labs Reviewed: reviewed  Pertinent Labs Comments: Potassium 2.0 L, BUN 29 H, Ca 7.4 L, Albumin 3.0 L, eGFR 39!  Pertinent Medications Reviewed: reviewed    BMP  Lab Results   Component Value Date     02/06/2023    K 3.0 (L) 02/06/2023    CL 99 02/06/2023    CO2 29 02/06/2023    BUN 29 (H) 02/06/2023    CREATININE 1.1 02/06/2023    CALCIUM 7.4 (L) 02/06/2023    ANIONGAP 14 02/06/2023    EGFRNORACEVR >60 02/06/2023     Lab Results   Component Value Date    ALT 30 02/06/2023    AST 38 02/06/2023    ALKPHOS 57 02/06/2023    BILITOT 1.0 02/06/2023     Scheduled Meds:   carvediloL  6.25 mg Oral BID    chlordiazepoxide  25 mg Oral TID    cyanocobalamin  100 mcg Oral Daily    enoxaparin  40 mg Subcutaneous Daily    folic acid  1 mg Oral Daily    furosemide  40 mg Oral Daily    potassium chloride  40 mEq Oral Q2H    sacubitriL-valsartan  1 tablet Oral BID    thiamine (VITAMIN B1) IVPB  500 mg Intravenous TID     Continuous Infusions:   sodium chloride 0.9% 75 mL/hr at 02/06/23 0902     PRN Meds:.acetaminophen, acetaminophen, cloNIDine, hydrALAZINE, HYDROcodone-acetaminophen, lorazepam, sodium chloride 0.9%    Physical Findings/Assessment         Estimated/Assessed Needs    Weight Used For " Calorie Calculations: 94.4 kg (208 lb 1.8 oz)  Energy Calorie Requirements (kcal): 8657-5815 (25-30 kca/kg (Non-Obese, DEX))  Energy Need Method: Kcal/kg  Protein Requirements: 76-95 (0.8-1.0 g/kg ABW (DEX))  Weight Used For Protein Calculations: 94.4 kg (208 lb 1.8 oz)  Fluid Requirements (mL): 2442 (500 mL + total output (DEX) (500 + 1942))  Estimated Fluid Requirement Method: RDA Method  RDA Method (mL): 2360  CHO Requirement: 295 (2360/8)    Nutrition Prescription Ordered    Current Diet Order: Cardiac diet    Evaluation of Received Nutrient/Fluid Intake  I/O (Net Since Admit)  2/6: +1,942.1 mL  Energy Calories Required: not meeting needs  Protein Required: not meeting needs  Fluid Required: not meeting needs  % Intake of Estimated Energy Needs: 50 - 75 %  % Meal Intake: 50 - 75 %    Nutrition Risk    Level of Risk/Frequency of Follow-up: Low (F/u x 1 weekly)    Monitor and Evaluation    Food and Nutrient Intake: energy intake, food and beverage intake  Food and Nutrient Adminstration: diet order  Knowledge/Beliefs/Attitudes: food and nutrition knowledge/skill, beliefs and attitudes  Anthropometric Measurements: weight, weight change, body mass index  Biochemical Data, Medical Tests and Procedures: electrolyte and renal panel, gastrointestinal profile, glucose/endocrine profile, inflammatory profile, lipid profile     Nutrition Follow-Up    RD Follow-up?: Yes    Clair Hardy, Dietetic Intern

## 2023-02-06 NOTE — SUBJECTIVE & OBJECTIVE
Interval History:  Patient has extensive ETOH abuse history.  Exhibiting symptoms of withdrawal to include: Tremors, agitation, confusion.  Initiated Librium, CIWA scale.     Review of Systems   Constitutional:  Positive for activity change, appetite change and fatigue. Negative for chills, diaphoresis, fever and unexpected weight change.   HENT:  Negative for congestion, hearing loss, nosebleeds, postnasal drip, rhinorrhea and trouble swallowing.    Eyes:  Negative for discharge and visual disturbance.   Respiratory:  Negative for cough, chest tightness and shortness of breath.    Cardiovascular:  Negative for chest pain, palpitations and leg swelling.   Gastrointestinal:  Positive for abdominal distention, abdominal pain and nausea. Negative for constipation, diarrhea and vomiting.   Endocrine: Negative for cold intolerance and heat intolerance.   Genitourinary:  Negative for difficulty urinating, dysuria, frequency and hematuria.   Musculoskeletal:  Positive for arthralgias and back pain. Negative for gait problem and myalgias.   Skin: Negative.    Neurological:  Positive for tremors, weakness and headaches. Negative for dizziness and light-headedness.   Hematological:  Negative for adenopathy. Does not bruise/bleed easily.   Psychiatric/Behavioral:  Positive for agitation and confusion. Negative for behavioral problems. The patient is nervous/anxious.    Objective:     Vital Signs (Most Recent):  Temp: 97.7 °F (36.5 °C) (02/06/23 1131)  Pulse: 66 (02/06/23 1303)  Resp: 17 (02/06/23 1424)  BP: (!) 152/70 (02/06/23 1131)  SpO2: 95 % (02/06/23 1131)   Vital Signs (24h Range):  Temp:  [96 °F (35.6 °C)-98.2 °F (36.8 °C)] 97.7 °F (36.5 °C)  Pulse:  [] 66  Resp:  [16-22] 17  SpO2:  [95 %-100 %] 95 %  BP: (145-182)/(70-94) 152/70     Weight: 94.4 kg (208 lb 1.8 oz)  Body mass index is 27.46 kg/m².    Intake/Output Summary (Last 24 hours) at 2/6/2023 1424  Last data filed at 2/6/2023 0902  Gross per 24 hour    Intake 1942.09 ml   Output --   Net 1942.09 ml      Physical Exam  Vitals and nursing note reviewed.   Constitutional:       General: He is not in acute distress.     Appearance: He is well-developed. He is ill-appearing. He is not diaphoretic.   HENT:      Head: Normocephalic and atraumatic.      Right Ear: Hearing and external ear normal.      Left Ear: Hearing and external ear normal.      Nose: Nose normal. No mucosal edema or rhinorrhea.      Mouth/Throat:      Pharynx: Uvula midline.   Eyes:      General:         Right eye: No discharge.         Left eye: No discharge.      Conjunctiva/sclera: Conjunctivae normal.      Right eye: No chemosis.     Left eye: No chemosis.     Pupils: Pupils are equal, round, and reactive to light.   Neck:      Thyroid: No thyroid mass or thyromegaly.      Trachea: Trachea normal.   Cardiovascular:      Rate and Rhythm: Normal rate and regular rhythm.      Pulses:           Dorsalis pedis pulses are 2+ on the right side and 2+ on the left side.      Heart sounds: Normal heart sounds. No murmur heard.  Pulmonary:      Effort: Pulmonary effort is normal. No respiratory distress.      Breath sounds: Normal breath sounds. No decreased breath sounds or wheezing.   Abdominal:      General: Bowel sounds are increased. There is distension.      Palpations: Abdomen is soft.      Tenderness: There is abdominal tenderness.   Musculoskeletal:         General: Normal range of motion.      Cervical back: Normal range of motion and neck supple.   Lymphadenopathy:      Cervical: No cervical adenopathy.      Upper Body:      Right upper body: No supraclavicular adenopathy.      Left upper body: No supraclavicular adenopathy.   Skin:     General: Skin is warm and dry.      Capillary Refill: Capillary refill takes less than 2 seconds.      Findings: No rash.   Neurological:      Mental Status: He is lethargic and disoriented.      Motor: Tremor (Fine tremors, bilateral upper extremity) present.    Psychiatric:         Cognition and Memory: He exhibits impaired recent memory and impaired remote memory.       Significant Labs: All pertinent labs within the past 24 hours have been reviewed.  CBC:   Recent Labs   Lab 02/05/23  0928 02/06/23  0932   WBC 6.27 3.60*   HGB 13.4* 12.0*   HCT 39.7* 36.0*   * 93*     CMP:   Recent Labs   Lab 02/05/23 0928 02/06/23  0932    142   K 3.9 3.0*   CL 94* 99   CO2 27 29   GLU 96 92   BUN 29* 29*   CREATININE 1.8* 1.1   CALCIUM 8.6* 7.4*   PROT 7.6 6.2   ALBUMIN 3.8 3.0*   BILITOT 1.1* 1.0   ALKPHOS 74 57   AST 45* 38   ALT 34 30   ANIONGAP 19* 14       Significant Imaging: I have reviewed all pertinent imaging results/findings within the past 24 hours.

## 2023-02-06 NOTE — ASSESSMENT & PLAN NOTE
- likely secondary to ETOH abuse    --daily CBC, CMP  --Transfuse platelets for platelet count <10K or <50K for bleeding or procedures

## 2023-02-06 NOTE — NURSING
Unable to complete admission questions due to patient not being orientated. Patient keeps yelling out to his  mother and sister.

## 2023-02-06 NOTE — ASSESSMENT & PLAN NOTE
- ? Etiology:  Likely secondary to ETOH abuse  - Patient is on MS Contin and recently started on oxycodone for break through pain by Dr. Abraham Mccall (PMNR)  - Hold all home meds, will place patient on PRN norco 5 to prevent withdrawal  - CT head negative  - Consider MRI if no improvement in mental status:  Overall improvement, hold on MRI  - of note, he was given one dose of ativan due to combative behavior.  Patient's wife at bedside states she gives him his medication, but states she doesn't think he got more than usual but is unsure.  She appeared to be confused about his medications as well.  Recommend NP at home program upon discharge.  - initiate Librium, CIWA

## 2023-02-06 NOTE — TREATMENT PLAN
Primary RN on floor notify provider patient is severely agitated and confused yelling I need to get out of here.CIWA-Ar score calculated at 15.  Per discussion held with nurse at bedside and patient, patient is a heavy drinker and reports drinking 2 pt of liquor daily.  Last date of consumption was yesterday.  Vital signs stable other than moderately elevated BP.  Banana bag ordered and patient will be placed on Ativan p.r.n. for withdrawal symptoms.  We will continue to monitor.

## 2023-02-06 NOTE — ASSESSMENT & PLAN NOTE
Extensive history of alcohol abuse    --SAMEERA  --Librium t.i.d.  --Ativan p.r.n.  --Thiamine IV  --folic acid, B12

## 2023-02-06 NOTE — HOSPITAL COURSE
72-year-old male, in the [facility] medicine for altered mental status, ETOH withdrawal.  Patient hypertensive overnight, tremors.  Ordered Librium t.i.d., thiamine repletion.  K +:  3.0, repleted, will repeat lab in the morning.  Family at bedside, concerned about abdominal distension, ordered ultrasound: Negative for ascites.  Patient continues with mild confusion, agitation.  Possible discharge in a.m..  2/7/23 Pt was seen and examined at bedside . He was determined to be suitable for d/c . He is aao x 4 in nad  . Pt mental status is back to baseline .  Pt kidney function  is back to normal . Pt will be d/c on po librium taper  . He was advised to stop baclofen and drinking ETOH( he report stop drinking 3 weeks ago ) . Case d/w Pt and family at bedside .

## 2023-02-06 NOTE — H&P
"[facility] - Emergency Dept.  [facility] Medicine  History & Physical    Patient Name: Ramez Miller  MRN: [id number]  Patient Class: OP- Observation  Admission Date: 2/5/2023  Attending Physician: Hank Mccall MD   Primary Care Provider: Primary Doctor No         Patient information was obtained from spouse/SO and ER records.     Subjective:     Principal Problem:Disorientation    Chief Complaint:   Chief Complaint   Patient presents with    Neck Pain    Altered Mental Status        HPI: Mr. Miller is a 72 year old male with a history of chronic neck/back pain followed by Dr. CHRISTOPHE Mccall and HTN who presents to ed with wife d/t confusion that has progressively gotten worse over the last two days.  Wife states the patient is "acting like he is hallucinating."  Wife does report that he patient was recently started on sin for breakthrough pain in addition to his scheduled MS contin and baclofen.  Symptoms are constant and moderate in severity. No mitigating or exacerbating factors reported.  In the ed lab work notable for platelets 110, Creatinine 1.8, bili 1.1, Trop 0.02 and .  UDS positive for opiates. UA with no signs of infection.  Chest xray with no acute processes.  CT C-spine with no acute cervical spine fracture.  CT head negative for acute intracranial abnormality.   In the ed, patient became agitated and aggressive and was give 1 mg IV ativan.  At assessment, patient drowsy from medication, opens eyes to painful stimuli, wife at bedside.  Patient will be admitted to observation for continued work-up of AMS.     Code Status Full  Surrogate decision maker wife Deedee        Past Medical History:   Diagnosis Date    Chronic pain     Hypertension        History reviewed. No pertinent surgical history.    Review of patient's allergies indicates:  No Known Allergies    No current facility-administered medications on file prior to encounter.     Current Outpatient Medications on File Prior to Encounter "   Medication Sig    baclofen (LIORESAL) 10 MG tablet Take 10 mg by mouth 3 (three) times daily as needed.    carvediloL (COREG) 12.5 MG tablet Take 6.25 mg by mouth 2 (two) times daily.    furosemide (LASIX) 40 MG tablet Take 40 mg by mouth once daily.    gabapentin (NEURONTIN) 600 MG tablet Take 600 mg by mouth 3 (three) times daily as needed.    morphine (MS CONTIN) 30 MG 12 hr tablet SMARTSI Tablet(s) By Mouth Every 12 Hours PRN    ondansetron (ZOFRAN-ODT) 4 MG TbDL Take 4 mg by mouth every 8 (eight) hours as needed.    oxyCODONE (ROXICODONE) 10 mg Tab immediate release tablet Take 10 mg by mouth every 8 (eight) hours as needed.    sacubitriL-valsartan (ENTRESTO) 24-26 mg per tablet Take 1 tablet by mouth 2 (two) times daily.     Family History    None       Tobacco Use    Smoking status: Not on file    Smokeless tobacco: Not on file   Substance and Sexual Activity    Alcohol use: Not on file    Drug use: Not on file    Sexual activity: Not on file     Review of Systems   Unable to perform ROS: Mental status change   Objective:     Vital Signs (Most Recent):  Temp: 98.1 °F (36.7 °C) (23 0842)  Pulse: 102 (23 1230)  Resp: (!) 26 (23 1230)  BP: 131/89 (23 1230)  SpO2: (!) 94 % (23 1230)   Vital Signs (24h Range):  Temp:  [98.1 °F (36.7 °C)] 98.1 °F (36.7 °C)  Pulse:  [] 102  Resp:  [18-37] 26  SpO2:  [94 %-97 %] 94 %  BP: (131-226)/() 131/89     Weight: 98.8 kg (217 lb 14.4 oz)  Body mass index is 28.75 kg/m².    Physical Exam  Constitutional:       Comments: Chronically ill appearing, lethargic, opens eyes to voice   Cardiovascular:      Rate and Rhythm: Normal rate and regular rhythm.      Pulses: Normal pulses.      Heart sounds: Normal heart sounds.   Pulmonary:      Effort: Pulmonary effort is normal.      Breath sounds: Normal breath sounds. No wheezing.   Abdominal:      General: Bowel sounds are normal. There is no distension.      Palpations:  Abdomen is soft.      Tenderness: There is no abdominal tenderness.   Musculoskeletal:         General: No swelling.   Skin:     General: Skin is warm and dry.   Neurological:      Comments: Lethargic, opens eyes to painful stimuli, not following commands, moves all extremities spontaneously          Significant Labs: All pertinent labs within the past 24 hours have been reviewed.    Significant Imaging: I have reviewed all pertinent imaging results/findings within the past 24 hours.    Assessment/Plan:     * Disorientation  - ? Etiology  - Patient is on MS Contin and recently started on oxycodone for break through pain [facility]  - Hold all home meds, will place patient on PRN norco 5 to prevent withdrawal  - CT head negative  - Consider MRI if no improvement in mental status.  - of note, he was given one dose of ativan due to combative behavior.  Patient's wife at bedside states she gives him his medication, but states she doesn't think he got more than usual but is unsure.  She appeared to be confused about his medications as well.  Recommend NP at home program upon discharge.      Thrombocytopenia  - unclear etiology, no s/sx of bleeding  - repeat lab work in am.         EDX (acute kidney injury)  Patient with acute kidney injury likely due to IVVD/dehydration DEX is currently stable. Labs reviewed- Renal function/electrolytes with Estimated Creatinine Clearance: 44.6 mL/min (A) (based on SCr of 1.8 mg/dL (H)). according to latest data. Monitor urine output and serial BMP and adjust therapy as needed. Avoid nephrotoxins and renally dose meds for GFR listed above.   - start IVF and repeat labs in am         CHF (congestive heart failure)  Patient is identified as having heart failure that is Chronic. CHF is currently controlled. Latest ECHO performed and demonstrates- No results found for this or any previous visit.  . Continue Beta Blocker and Furosemide and monitor clinical status closely.  Monitor on telemetry. Patient is off CHF pathway.  Monitor strict Is&Os and daily weights.  Place on fluid restriction of 1.5 L. Continue to stress to patient importance of self efficacy and  on diet for CHF. Last BNP reviewed- and noted below   Recent Labs   Lab 02/05/23  0928   *   .  - Echo pending  - continue home lasix and entresto      Hypertension  - Continue home meds once awake  - Hydralazine PRN      Chronic pain  - PMNR: Dr. TOM Mccall  - Patient is on MS contin and Oxycodone  - would DC oxycode on discharge and recommend follow up with Dr. TOM Mccall.        VTE Risk Mitigation (From admission, onward)         Ordered     enoxaparin injection 40 mg  Daily         02/05/23 1309     IP VTE HIGH RISK PATIENT  Once         02/05/23 1309     Place sequential compression device  Until discontinued         02/05/23 1309                   Hank Mccall MD  Department of Hospital Medicine   [facility] - Emergency Dept.

## 2023-02-06 NOTE — PLAN OF CARE
Pt remains free from falls/injuries this shift. Safety precautions maintained. Pain managed with pain medication. Tele monitoring per orders. BM today. No s/s of acute distress noted. Will continue to monitor. Chart check completed.

## 2023-02-06 NOTE — ASSESSMENT & PLAN NOTE
Patient with acute kidney injury likely due to IVVD/dehydration DEX is currently stable. Labs reviewed- Renal function/electrolytes with Estimated Creatinine Clearance: 66.6 mL/min (based on SCr of 1.1 mg/dL). according to latest data. Monitor urine output and serial BMP and adjust therapy as needed. Avoid nephrotoxins and renally dose meds for GFR listed above.   - start IVF and repeat labs in am

## 2023-02-07 VITALS
TEMPERATURE: 99 F | DIASTOLIC BLOOD PRESSURE: 84 MMHG | RESPIRATION RATE: 18 BRPM | SYSTOLIC BLOOD PRESSURE: 163 MMHG | HEIGHT: 73 IN | BODY MASS INDEX: 27.59 KG/M2 | WEIGHT: 208.13 LBS | HEART RATE: 72 BPM | OXYGEN SATURATION: 96 %

## 2023-02-07 LAB
ALBUMIN SERPL BCP-MCNC: 2.9 G/DL (ref 3.5–5.2)
ALP SERPL-CCNC: 50 U/L (ref 55–135)
ALT SERPL W/O P-5'-P-CCNC: 26 U/L (ref 10–44)
ANION GAP SERPL CALC-SCNC: 10 MMOL/L (ref 8–16)
ANISOCYTOSIS BLD QL SMEAR: SLIGHT
AST SERPL-CCNC: 37 U/L (ref 10–40)
BASOPHILS # BLD AUTO: 0.03 K/UL (ref 0–0.2)
BASOPHILS NFR BLD: 0.6 % (ref 0–1.9)
BILIRUB SERPL-MCNC: 0.7 MG/DL (ref 0.1–1)
BUN SERPL-MCNC: 25 MG/DL (ref 8–23)
CALCIUM SERPL-MCNC: 7.3 MG/DL (ref 8.7–10.5)
CHLORIDE SERPL-SCNC: 108 MMOL/L (ref 95–110)
CO2 SERPL-SCNC: 27 MMOL/L (ref 23–29)
CREAT SERPL-MCNC: 1 MG/DL (ref 0.5–1.4)
DACRYOCYTES BLD QL SMEAR: ABNORMAL
DIFFERENTIAL METHOD: ABNORMAL
EOSINOPHIL # BLD AUTO: 0.1 K/UL (ref 0–0.5)
EOSINOPHIL NFR BLD: 1 % (ref 0–8)
ERYTHROCYTE [DISTWIDTH] IN BLOOD BY AUTOMATED COUNT: 15.5 % (ref 11.5–14.5)
EST. GFR  (NO RACE VARIABLE): >60 ML/MIN/1.73 M^2
GLUCOSE SERPL-MCNC: 98 MG/DL (ref 70–110)
HCT VFR BLD AUTO: 35.2 % (ref 40–54)
HGB BLD-MCNC: 11.4 G/DL (ref 14–18)
IMM GRANULOCYTES # BLD AUTO: 0.05 K/UL (ref 0–0.04)
IMM GRANULOCYTES NFR BLD AUTO: 1 % (ref 0–0.5)
LYMPHOCYTES # BLD AUTO: 1.2 K/UL (ref 1–4.8)
LYMPHOCYTES NFR BLD: 24.9 % (ref 18–48)
MCH RBC QN AUTO: 31.9 PG (ref 27–31)
MCHC RBC AUTO-ENTMCNC: 32.4 G/DL (ref 32–36)
MCV RBC AUTO: 99 FL (ref 82–98)
MONOCYTES # BLD AUTO: 0.6 K/UL (ref 0.3–1)
MONOCYTES NFR BLD: 12 % (ref 4–15)
NEUTROPHILS # BLD AUTO: 2.9 K/UL (ref 1.8–7.7)
NEUTROPHILS NFR BLD: 60.5 % (ref 38–73)
NRBC BLD-RTO: 0 /100 WBC
PLATELET # BLD AUTO: 99 K/UL (ref 150–450)
PLATELET BLD QL SMEAR: ABNORMAL
PMV BLD AUTO: 11.4 FL (ref 9.2–12.9)
POTASSIUM SERPL-SCNC: 3.8 MMOL/L (ref 3.5–5.1)
PROT SERPL-MCNC: 6.1 G/DL (ref 6–8.4)
RBC # BLD AUTO: 3.57 M/UL (ref 4.6–6.2)
SODIUM SERPL-SCNC: 145 MMOL/L (ref 136–145)
TARGETS BLD QL SMEAR: ABNORMAL
WBC # BLD AUTO: 4.82 K/UL (ref 3.9–12.7)

## 2023-02-07 PROCEDURE — 80053 COMPREHEN METABOLIC PANEL: CPT | Performed by: NURSE PRACTITIONER

## 2023-02-07 PROCEDURE — 85025 COMPLETE CBC W/AUTO DIFF WBC: CPT | Performed by: NURSE PRACTITIONER

## 2023-02-07 PROCEDURE — 96375 TX/PRO/DX INJ NEW DRUG ADDON: CPT

## 2023-02-07 PROCEDURE — 27000221 HC OXYGEN, UP TO 24 HOURS

## 2023-02-07 PROCEDURE — 63600175 PHARM REV CODE 636 W HCPCS: Performed by: NURSE PRACTITIONER

## 2023-02-07 PROCEDURE — 36415 COLL VENOUS BLD VENIPUNCTURE: CPT | Performed by: NURSE PRACTITIONER

## 2023-02-07 PROCEDURE — 97162 PT EVAL MOD COMPLEX 30 MIN: CPT

## 2023-02-07 PROCEDURE — 97530 THERAPEUTIC ACTIVITIES: CPT

## 2023-02-07 PROCEDURE — 25000003 PHARM REV CODE 250: Performed by: NURSE PRACTITIONER

## 2023-02-07 PROCEDURE — 94761 N-INVAS EAR/PLS OXIMETRY MLT: CPT

## 2023-02-07 PROCEDURE — 25000003 PHARM REV CODE 250: Performed by: FAMILY MEDICINE

## 2023-02-07 PROCEDURE — 96366 THER/PROPH/DIAG IV INF ADDON: CPT

## 2023-02-07 PROCEDURE — 96361 HYDRATE IV INFUSION ADD-ON: CPT

## 2023-02-07 RX ORDER — FOLIC ACID 1 MG/1
1 TABLET ORAL DAILY
Qty: 30 TABLET | Refills: 1 | Status: SHIPPED | OUTPATIENT
Start: 2023-02-08 | End: 2024-02-08

## 2023-02-07 RX ORDER — CHLORDIAZEPOXIDE HYDROCHLORIDE 25 MG/1
CAPSULE, GELATIN COATED ORAL
Qty: 20 CAPSULE | Refills: 0 | Status: SHIPPED | OUTPATIENT
Start: 2023-02-07 | End: 2023-02-18

## 2023-02-07 RX ORDER — PNV NO.95/FERROUS FUM/FOLIC AC 28MG-0.8MG
100 TABLET ORAL DAILY
Qty: 30 TABLET | Refills: 1 | Status: SHIPPED | OUTPATIENT
Start: 2023-02-08 | End: 2023-03-10

## 2023-02-07 RX ORDER — FUROSEMIDE 10 MG/ML
40 INJECTION INTRAMUSCULAR; INTRAVENOUS ONCE
Status: DISCONTINUED | OUTPATIENT
Start: 2023-02-07 | End: 2023-02-07

## 2023-02-07 RX ADMIN — THIAMINE HYDROCHLORIDE 500 MG: 100 INJECTION, SOLUTION INTRAMUSCULAR; INTRAVENOUS at 09:02

## 2023-02-07 RX ADMIN — CHLORDIAZEPOXIDE HYDROCHLORIDE 25 MG: 25 CAPSULE ORAL at 09:02

## 2023-02-07 RX ADMIN — HYDRALAZINE HYDROCHLORIDE 10 MG: 20 INJECTION, SOLUTION INTRAMUSCULAR; INTRAVENOUS at 04:02

## 2023-02-07 RX ADMIN — HYDROCODONE BITARTRATE AND ACETAMINOPHEN 1 TABLET: 5; 325 TABLET ORAL at 06:02

## 2023-02-07 RX ADMIN — SACUBITRIL AND VALSARTAN 1 TABLET: 24; 26 TABLET, FILM COATED ORAL at 09:02

## 2023-02-07 RX ADMIN — CARVEDILOL 6.25 MG: 6.25 TABLET, FILM COATED ORAL at 09:02

## 2023-02-07 RX ADMIN — LORAZEPAM 1 MG: 2 INJECTION INTRAMUSCULAR; INTRAVENOUS at 03:02

## 2023-02-07 RX ADMIN — Medication 100 MCG: at 09:02

## 2023-02-07 RX ADMIN — FOLIC ACID 1 MG: 1 TABLET ORAL at 09:02

## 2023-02-07 RX ADMIN — FUROSEMIDE 40 MG: 40 TABLET ORAL at 09:02

## 2023-02-07 NOTE — PLAN OF CARE
[facility] - Med Surg  Discharge Final Note    Primary Care Provider: STEPHEN Holbrook    Expected Discharge Date: 2/7/2023    Final Discharge Note (most recent)       Final Note - 02/07/23 1407          Final Note    Assessment Type Final Discharge Note     Anticipated Discharge Disposition Home-Health Care [facility]     Hospital Resources/Appts/Education Provided Post-Acute resouces added to AVS        Post-Acute Status    Post-Acute Authorization Home Health     Home Health Status Set-up Complete/Auth obtained                     Important Message from Medicare             Contact Info       No, Primary Doctor        Next Steps: Follow up in 1 week(s)    [facility]   Specialty: Home Health Services    [address] [place] [id number]   Phone: [phone]       Next Steps: Follow up    Instructions: home health          DC Dispo:  home with resumption of [facility], orders uploaded to [facility].    PCP f/u: Scheduled Thursday next week per daughter, prefers to keep scheduled appt as is.

## 2023-02-07 NOTE — CONSULTS
Nutrition Recommendations 2/7:  1. Recommend pt recieves Isaac BID to assist with wound healing or per MD/NP orders.   2. Recommend Boost Plus TID   3. Recommend weekly weights  4. Collaboration with medical care providers     Goals:   1. Pt will consume and tolerate Isaac BID within 24-48 hrs  2. Pt will consume and tolerate Boost Plus by RD follow up  3. Pt will tolerate and consume > 75% EEN by RD follow up    Clair Hardy, Dietetic Intern

## 2023-02-07 NOTE — CONSULTS
[facility] - [facility] Surg  Wound Care    Patient Name:  Ramez Miller   MRN:  [id number]  Date: 2/7/2023  Diagnosis: Encephalopathy, metabolic    History:     Past Medical History:   Diagnosis Date    Chronic pain     Hypertension        Social History     Socioeconomic History    Marital status:        Precautions:     Allergies as of 02/05/2023    (No Known Allergies)       [facility] Assessment Details/Treatment     Consulted to this 72 year for male for wound care. He is awake and alert. history of chronic neck/back pain followed by Dr. CHRISTOPHE Mccall and HTN who presents to ed with wife d/t confusion. He is awake and alert. Sacrum and buttocks with chronic hyperpigmentation. Skin intact. Preventative foam in place. Please reconsult if needed.     02/07/2023

## 2023-02-07 NOTE — PT/OT/SLP EVAL
Physical Therapy Evaluation    Patient Name:  Ramez Miller   MRN:  [id number]    Recommendations:     Discharge Recommendations: home health PT   Discharge Equipment Recommendations: none   Barriers to discharge: None    Assessment:     Ramez Miller is a 72 y.o. male admitted with a medical diagnosis of Encephalopathy, metabolic.  He presents with the following impairments/functional limitations: weakness, impaired endurance, decreased lower extremity function, impaired cardiopulmonary response to activity .    Rehab Prognosis: Good; patient would benefit from acute skilled PT services to address these deficits and reach maximum level of function.    Recent Surgery: * No surgery found *      Plan:     During this hospitalization, patient to be seen 3 x/week to address the identified rehab impairments via gait training, therapeutic activities, therapeutic exercises and progress toward the following goals:    Plan of Care Expires:  02/21/23    Subjective     Chief Complaint: WEAKNESS  Patient/Family Comments/goals: INC MOBILITY   Pain/Comfort:  Pain Rating 1: 0/10  Pain Rating Post-Intervention 1: 0/10    Patients cultural, spiritual, Protestant conflicts given the current situation:      Living Environment:   PT LIVES AT HOME WITH WIFE IN A ONE STORY HOME WITH NO STEPS   Prior to admission, patients level of function waS MOD I WITH T/F BED>WC AND DOESN'T DRIVE .  Equipment used at home: hospital bed, wheelchair, bedside commode, bath bench.  DME owned (not currently used): rolling walker.  Upon discharge, patient will have assistance from WIFE .    Objective:     Communicated with DR. BERMAN AND Epic CHART REVIEW  prior to session.  Patient found supine with peripheral IV, telemetry, oxygen  upon PT entry to room.    General Precautions: Standard, fall  Orthopedic Precautions:N/A   Braces: N/A  Respiratory Status: Nasal cannula, flow 3 L/min    Exams:  Cognitive Exam:  Patient is oriented to Person, Place,  "Time, and Situation  RLE ROM: WFL  RLE Strength: LIMITED  LLE ROM: WFL  LLE Strength: LIMITED    Functional Mobility:  Bed Mobility:     Rolling Left:  stand by assistance  Supine to Sit: stand by assistance  Transfers:     Bed to Chair: contact guard assistance with  no AD  using  Squat Pivot      AM-PAC 6 CLICK MOBILITY  Total Score:14       Treatment & Education:  PT COMPLETED SQUAT PIVOT T/F X 3 TRIALS WITH CGA AND CUES FOR SAFETY. PT COMPLETED B LE TE X AP, TKE AND MIP X 10 REPS FOR ROM AND STRENGTHENING. PT SEATED IN CHAIR AND EDUCATED ON ROLE OF P.T. AND PT EDUCATED ON RISK FOR FALLS DUE TO GENERALIZED WEAKNESS, EDUCATED ON "CALL DON'T FALL", ENCOURAGED TO CALL FOR ASSISTANCE WITH ALL NEEDS SUCH AS BED<>CHAIR TRANSFERS OR TRIPS TO BATHROOM.      Patient left up in chair with all lines intact, call button in reach, and chair alarm on.    GOALS:   Multidisciplinary Problems       Physical Therapy Goals          Problem: Physical Therapy    Goal Priority Disciplines Outcome Goal Variances Interventions   Physical Therapy Goal     PT, PT/OT      Description: LT23  1. PT WILL COMPLETE BED MOBILITY GLORIA  2. PT WILL COMPLETE SQUAT PIVOT T/F TO CHAIR MOD I  3. PT WILL COMPLETE B LE TE X 20 REPS FOR STRENGTHENING.                        History:     Past Medical History:   Diagnosis Date    Chronic pain     Hypertension        History reviewed. No pertinent surgical history.    Time Tracking:     PT Received On: 23  PT Start Time: 1110     PT Stop Time: 1135  PT Total Time (min): 25 min     Billable Minutes: Evaluation 15 and Therapeutic Activity 10      2023  "

## 2023-02-07 NOTE — ASSESSMENT & PLAN NOTE
Patient with acute kidney injury likely due to IVVD/dehydration DEX is currently stable. Labs reviewed- Renal function/electrolytes with Estimated Creatinine Clearance: 73.2 mL/min (based on SCr of 1 mg/dL). according to latest data. Monitor urine output and serial BMP and adjust therapy as needed. Avoid nephrotoxins and renally dose meds for GFR listed above.   - start IVF and repeat labs in am

## 2023-02-07 NOTE — DISCHARGE SUMMARY
"[facility] Medicine  Discharge Summary      Patient Name: Ramez Miller  MRN: [id number]  PRESTON: [id number]  Patient Class: IP- Inpatient  Admission Date: 2/5/2023  Hospital Length of Stay: 1 days  Discharge Date and Time:  02/07/2023 12:44 PM  Attending Physician: Aman Navarrete, *   Discharging Provider: Aman Navarrete MD  Primary Care Provider: Primary Doctor No    Primary Care Team: Networked reference to record PCT     HPI:   Mr. Miller is a 72 year old male with a history of chronic neck/back pain followed by Dr. CHRISTOPHE Mccall and HTN who presents to ed with wife d/t confusion that has progressively gotten worse over the last two days.  Wife states the patient is "acting like he is hallucinating."  Wife does report that he patient was recently started on sin for breakthrough pain in addition to his scheduled MS contin and baclofen.  Symptoms are constant and moderate in severity. No mitigating or exacerbating factors reported.  In the ed lab work notable for platelets 110, Creatinine 1.8, bili 1.1, Trop 0.02 and .  UDS positive for opiates. UA with no signs of infection.  Chest xray with no acute processes.  CT C-spine with no acute cervical spine fracture.  CT head negative for acute intracranial abnormality.   In the ed, patient became agitated and aggressive and was give 1 mg IV ativan.  At assessment, patient drowsy from medication, opens eyes to painful stimuli, wife at bedside.  Patient will be admitted to observation for continued work-up of AMS.     Code Status Full  Surrogate decision maker wife Deedee        * No surgery found *      Hospital Course:   72-year-old male, in the management hospital medicine for altered mental status, ETOH withdrawal.  Patient hypertensive overnight, tremors.  Ordered Librium t.i.d., thiamine repletion.  K +:  3.0, repleted, will repeat lab in the morning.  Family at bedside, concerned about abdominal distension, ordered ultrasound: " Negative for ascites.  Patient continues with mild confusion, agitation.  Possible discharge in a.m..  2/7/23 Pt was seen and examined at bedside . He was determined to be suitable for d/c . He is aao x 4 in nad  . Pt mental status is back to baseline .  Pt kidney function  is back to normal . Pt will be d/c on po librium taper  . He was advised to stop baclofen and drinking ETOH( he report stop drinking 3 weeks ago ) . Case d/w Pt and family at bedside .       Goals of Care Treatment Preferences:  Code Status: Full Code      Consults:   Consults (From admission, onward)        Status Ordering Provider     Inpatient consult to Registered Dietitian/Nutritionist  Once        Provider:  (Not yet assigned)    Completed MAYA STROUD          * Encephalopathy, metabolic  - ? Etiology:  Likely secondary to ETOH abuse  - Patient is on MS Contin and recently started on oxycodone for break through pain by Dr. Abraham Stroud (PMNR)  - Hold all home meds, will place patient on PRN norco 5 to prevent withdrawal  - CT head negative  - Consider MRI if no improvement in mental status:  Overall improvement, hold on MRI  - of note, he was given one dose of ativan due to combative behavior.  Patient's wife at bedside states she gives him his medication, but states she doesn't think he got more than usual but is unsure.  She appeared to be confused about his medications as well.  Recommend NP at home program upon discharge.  - initiate Librium, CIWA      Alcohol withdrawal syndrome, with delirium  Extensive history of alcohol abuse    --CIWA  --Librium t.i.d.  --Ativan p.r.n.  --Thiamine IV  --folic acid, B12      Thrombocytopenia  - likely secondary to ETOH abuse    --daily CBC, CMP  --Transfuse platelets for platelet count <10K or <50K for bleeding or procedures        DEX (acute kidney injury)  Patient with acute kidney injury likely due to IVVD/dehydration DEX is currently stable. Labs reviewed- Renal function/electrolytes with  Estimated Creatinine Clearance: 73.2 mL/min (based on SCr of 1 mg/dL). according to latest data. Monitor urine output and serial BMP and adjust therapy as needed. Avoid nephrotoxins and renally dose meds for GFR listed above.   - start IVF and repeat labs in am         CHF (congestive heart failure)  Patient is identified as having heart failure that is Chronic. CHF is currently controlled. Latest ECHO performed and demonstrates- No results found for this or any previous visit.  . Continue Beta Blocker and Furosemide and monitor clinical status closely. Monitor on telemetry. Patient is off CHF pathway.  Monitor strict Is&Os and daily weights.  Place on fluid restriction of 1.5 L. Continue to stress to patient importance of self efficacy and  on diet for CHF. Last BNP reviewed- and noted below   Recent Labs   Lab 02/05/23  0928   *   .  - Echo pending  - continue home lasix and entresto      Hypertension  - Continue home meds once awake  - Hydralazine PRN      Chronic pain  - PMNR: Dr. TOM Mccall  - Patient is on MS contin and Oxycodone  - would DC oxycode on discharge and recommend follow up with Dr. TOM Mccall.        Final Active Diagnoses:    Diagnosis Date Noted POA    PRINCIPAL PROBLEM:  Encephalopathy, metabolic [G93.41] 02/05/2023 Yes    Alcohol withdrawal syndrome, with delirium [F10.931] 02/06/2023 Yes    Chronic pain [G89.29] 02/05/2023 Yes    Hypertension [I10] 02/05/2023 Yes    CHF (congestive heart failure) [I50.9] 02/05/2023 Yes    DEX (acute kidney injury) [N17.9] 02/05/2023 Yes    Thrombocytopenia [D69.6] 02/05/2023 Yes      Problems Resolved During this Admission:       Discharged Condition: stable     Disposition: Home-Health Care [facility]    Follow Up:   Follow-up Information     Primary Doctor No Follow up in 1 week(s).                     Patient Instructions:      Ambulatory referral/consult to [facility]   Standing Status: Future   Referral Priority: Routine Referral  Type: Consultation   Referral Reason: Specialty Services Required   Number of Visits Requested: 1     Ambulatory referral/consult to Home Health   Standing Status: Future   Referral Priority: Routine Referral Type: Home Health   Referral Reason: Specialty Services Required   Requested Specialty: Home Health Services   Number of Visits Requested: 1     Diet Cardiac     Activity as tolerated       Significant Diagnostic Studies: Labs:   BMP:   Recent Labs   Lab 02/06/23  0932 02/07/23  0737   GLU 92 98    145   K 3.0* 3.8   CL 99 108   CO2 29 27   BUN 29* 25*   CREATININE 1.1 1.0   CALCIUM 7.4* 7.3*   , CMP   Recent Labs   Lab 02/06/23  0932 02/07/23  0737    145   K 3.0* 3.8   CL 99 108   CO2 29 27   GLU 92 98   BUN 29* 25*   CREATININE 1.1 1.0   CALCIUM 7.4* 7.3*   PROT 6.2 6.1   ALBUMIN 3.0* 2.9*   BILITOT 1.0 0.7   ALKPHOS 57 50*   AST 38 37   ALT 30 26   ANIONGAP 14 10    and CBC   Recent Labs   Lab 02/06/23  0932 02/07/23  0737   WBC 3.60* 4.82   HGB 12.0* 11.4*   HCT 36.0* 35.2*   PLT 93* 99*     Microbiology: Blood Culture No results found for: LABBLOO    Pending Diagnostic Studies:     Procedure Component Value Units Date/Time    CBC auto differential [[id number]]     Order Status: Sent Lab Status: No result     Specimen: Blood     Comprehensive metabolic panel [[id number]]     Order Status: Sent Lab Status: No result     Specimen: Blood          Medications:  Reconciled Home Medications:      Medication List      START taking these medications    chlordiazepoxide 25 MG Cap  Commonly known as: LIBRIUM  Take 1 capsule (25 mg total) by mouth 3 (three) times daily for 3 days, THEN 1 capsule (25 mg total) 2 (two) times a day for 3 days, THEN 1 capsule (25 mg total) once daily for 5 days.  Start taking on: February 7, 2023     cyanocobalamin 100 MCG tablet  Commonly known as: VITAMIN B-12  Take 1 tablet (100 mcg total) by mouth once daily.  Start taking on: February 8, 2023     folic acid 1 MG  tablet  Commonly known as: FOLVITE  Take 1 tablet (1 mg total) by mouth once daily.  Start taking on: 2023        CONTINUE taking these medications    carvediloL 12.5 MG tablet  Commonly known as: COREG  Take 6.25 mg by mouth 2 (two) times daily.     furosemide 40 MG tablet  Commonly known as: LASIX  Take 40 mg by mouth once daily.     gabapentin 600 MG tablet  Commonly known as: NEURONTIN  Take 600 mg by mouth 3 (three) times daily as needed.     morphine 30 MG 12 hr tablet  Commonly known as: MS CONTIN  SMARTSI Tablet(s) By Mouth Every 12 Hours PRN     ondansetron 4 MG Tbdl  Commonly known as: ZOFRAN-ODT  Take 4 mg by mouth every 8 (eight) hours as needed.     oxyCODONE 10 mg Tab immediate release tablet  Commonly known as: ROXICODONE  Take 10 mg by mouth every 8 (eight) hours as needed.     sacubitriL-valsartan 24-26 mg per tablet  Commonly known as: ENTRESTO  Take 1 tablet by mouth 2 (two) times daily.        STOP taking these medications    baclofen 10 MG tablet  Commonly known as: LIORESAL            Indwelling Lines/Drains at time of discharge:   Lines/Drains/Airways     None                 Time spent on the discharge of patient: 30 minutes         Aman Navarrete MD  Department of Hospital Medicine  [facility]

## 2023-02-09 ENCOUNTER — PES CALL (OUTPATIENT)
Dept: ADMINISTRATIVE | Facility: CLINIC | Age: 73
End: 2023-02-09
Payer: MEDICARE

## 2023-02-10 ENCOUNTER — PES CALL (OUTPATIENT)
Dept: ADMINISTRATIVE | Facility: CLINIC | Age: 73
End: 2023-02-10
Payer: MEDICARE

## 2023-02-14 ENCOUNTER — OFFICE VISIT (OUTPATIENT)
Dept: HOME HEALTH SERVICES | Facility: CLINIC | Age: 73
End: 2023-02-14
Payer: MEDICARE

## 2023-02-14 VITALS
DIASTOLIC BLOOD PRESSURE: 78 MMHG | OXYGEN SATURATION: 97 % | HEART RATE: 76 BPM | RESPIRATION RATE: 18 BRPM | SYSTOLIC BLOOD PRESSURE: 138 MMHG

## 2023-02-14 DIAGNOSIS — R41.82 ALTERED MENTAL STATUS, UNSPECIFIED ALTERED MENTAL STATUS TYPE: ICD-10-CM

## 2023-02-14 DIAGNOSIS — I10 HYPERTENSION, UNSPECIFIED TYPE: ICD-10-CM

## 2023-02-14 DIAGNOSIS — F10.931 ALCOHOL WITHDRAWAL SYNDROME, WITH DELIRIUM: ICD-10-CM

## 2023-02-14 DIAGNOSIS — Z09 HOSPITAL DISCHARGE FOLLOW-UP: Primary | ICD-10-CM

## 2023-02-14 PROCEDURE — 99203 OFFICE O/P NEW LOW 30 MIN: CPT | Mod: S$GLB,,,

## 2023-02-14 PROCEDURE — 99203 PR OFFICE/OUTPT VISIT, NEW, LEVL III, 30-44 MIN: ICD-10-PCS | Mod: S$GLB,,,

## 2023-02-14 NOTE — PROGRESS NOTES
"  [facility]  Transition of Care Home Visit    Visit Date: 2/14/2023  Encounter Provider: Luis Green   PCP:  STEPHEN Holbrook    PRESENTING HISTORY      Patient ID: Ramez Miller is a 72 y.o. male.    Consult Requested By:  Dr. Aman Lewis*  Reason for Consult:  Hospital Follow Up.    Ramez is being seen at home due to being seen at home due to physical debility that presents a taxing effort to leave the home, to mitigate high risk of hospital readmission and/or due to the limited availability of reliable or safe options for transportation to the point of access to the provider. Prior to treatment on this visit the chart was reviewed and patient verbal consent was obtained.      Chief Complaint: Transitional Care        History of Present Illness: Mr. Ramez Miller is a 72 y.o. male who was recently admitted to the hospital.    2/5-2/7 admission presented to the Emergency Department for evaluation of AMS x 2 days. Pt's family states pt has been "acting like he is hallucinating."  Pt reports chronic neck and shoulder pain. Pt's family member denies pt having any recent alcohol or recent recreational drug use.  Symptoms are constant and moderate in severity. No mitigating or exacerbating factors reported. Patient denies any fever, chills, SOB, abdominal pain, dysuria, and all other sxs at this time.  No further complaints or concerns at this time.   ___________________________________________________________________    Today:    HPI:  Patient is a 71 yo male being seen today for a post hospital discharge assessment following a recent admit for evaluation of AMS. Patient presented with AMS x 2 days.  See hospital course for details.   Patient is found in their home today, in no distress and agreeable to this visit.  Patient's wife is present on visit.  Upon visit today patient is sitting in his wheelchair, VSS.  Patient reports no reoccurrence of symptoms in which prompted the hospital stay.  " He reports doing well since hospital stay.  He reports a cough and congestion for 3-4 weeks.  Instructed him to take robitussin-DM to aid in cough relief.  He currently smokes.  He reports no drinking and is taking his librium.  He reports chronic neck pain which is managed with current medication.  He sees Dr. Mccall with pain management.  Patient reports compliance with all medications.  Patient has no further complaints or concerns at this time.   Reports that he is going to make an appointment to see primary as well to follow up.  Questions elicited. Time allowed for questions, all issues addressed. Contact info given for any concerns or changes.            Review of Systems   Constitutional: Negative.    HENT:  Positive for congestion.    Eyes: Negative.    Respiratory:  Positive for cough. Negative for chest tightness.    Cardiovascular: Negative.  Negative for leg swelling.   Gastrointestinal:  Positive for abdominal distention.   Endocrine: Negative.    Genitourinary: Negative.    Musculoskeletal:  Positive for gait problem, myalgias and neck pain.   Skin: Negative.    Allergic/Immunologic: Negative.    Neurological:  Positive for weakness.   Hematological: Negative.    Psychiatric/Behavioral: Negative.  Negative for agitation and confusion.    All other systems reviewed and are negative.    Assessments:  Environmental: Patient lives in a single story home with spouse.  There is adequate lighting and the temperature is comfortable.    Functional Status: Uses wheelchair to get around.  States he has been getting around in wheelchair for a long time.   Safety: Fall precautions.   Nutritional: Adequate food in the home.   Home Health/DME/Supplies: [facility].  DME: wheelchair    PAST HISTORY:     Past Medical History:   Diagnosis Date    Chronic pain     Hypertension        No past surgical history on file.    No family history on file.    Social History     Socioeconomic History    Marital  status:        MEDICATIONS & ALLERGIES:     Current Outpatient Medications on File Prior to Visit   Medication Sig Dispense Refill    carvediloL (COREG) 12.5 MG tablet Take 6.25 mg by mouth 2 (two) times daily.      chlordiazepoxide (LIBRIUM) 25 MG Cap Take 1 capsule (25 mg total) by mouth 3 (three) times daily for 3 days, THEN 1 capsule (25 mg total) 2 (two) times a day for 3 days, THEN 1 capsule (25 mg total) once daily for 5 days. 20 capsule 0    cyanocobalamin (VITAMIN B-12) 100 MCG tablet Take 1 tablet (100 mcg total) by mouth once daily. 30 tablet 1    folic acid (FOLVITE) 1 MG tablet Take 1 tablet (1 mg total) by mouth once daily. 30 tablet 1    furosemide (LASIX) 40 MG tablet Take 40 mg by mouth once daily.      gabapentin (NEURONTIN) 600 MG tablet Take 600 mg by mouth 3 (three) times daily as needed.      morphine (MS CONTIN) 30 MG 12 hr tablet SMARTSI Tablet(s) By Mouth Every 12 Hours PRN      ondansetron (ZOFRAN-ODT) 4 MG TbDL Take 4 mg by mouth every 8 (eight) hours as needed.      oxyCODONE (ROXICODONE) 10 mg Tab immediate release tablet Take 10 mg by mouth every 8 (eight) hours as needed.      sacubitriL-valsartan (ENTRESTO) 24-26 mg per tablet Take 1 tablet by mouth 2 (two) times daily.       No current facility-administered medications on file prior to visit.        Review of patient's allergies indicates:  No Known Allergies    OBJECTIVE:     Vital Signs:  Vitals:    23 1312   BP: 138/78   Pulse: 76   Resp: 18     There is no height or weight on file to calculate BMI.     Physical Exam:  Physical Exam  Vitals reviewed.   Constitutional:       General: He is not in acute distress.     Appearance: Normal appearance. He is well-developed.   HENT:      Head: Normocephalic and atraumatic.      Nose: Nose normal.      Mouth/Throat:      Mouth: Mucous membranes are dry.      Pharynx: Oropharynx is clear.   Eyes:      Pupils: Pupils are equal, round, and reactive to light.   Cardiovascular:       Rate and Rhythm: Normal rate and regular rhythm.      Pulses: Normal pulses.      Heart sounds: Normal heart sounds.   Pulmonary:      Effort: Pulmonary effort is normal.      Breath sounds: Normal breath sounds.   Abdominal:      General: Bowel sounds are normal. There is distension.      Palpations: Abdomen is soft.   Musculoskeletal:         General: Normal range of motion.      Cervical back: Normal range of motion and neck supple.   Skin:     General: Skin is warm and dry.   Neurological:      General: No focal deficit present.      Mental Status: He is alert and oriented to person, place, and time. Mental status is at baseline.      Motor: Weakness present.      Gait: Gait abnormal.   Psychiatric:         Behavior: Behavior normal.         Thought Content: Thought content normal.         Judgment: Judgment normal.       Laboratory  Lab Results   Component Value Date    WBC 4.82 02/07/2023    HGB 11.4 (L) 02/07/2023    HCT 35.2 (L) 02/07/2023    MCV 99 (H) 02/07/2023    PLT 99 (L) 02/07/2023     Lab Results   Component Value Date    INR 1.1 04/23/2022     No results found for: HGBA1C  No results for input(s): POCTGLUCOSE in the last 72 hours.    Diagnostic Results:      TRANSITION OF CARE:     [facility] Contact Note: 2/10    Family and/or Caretaker present at visit?  Yes.  Diagnostic tests reviewed/disposition: No diagnosic tests pending after this hospitalization.  Disease/illness education: Take all medication as prescribed. Activity as tolerated. Keep all upcoming appts.   Home health/community services discussion/referrals: Patient has home health established at [facility] .   Establishment or re-establishment of referral orders for community resources: No other necessary community resources.   Discussion with other health care providers: No discussion with other health care providers necessary.     Transition of Care Visit:  I have reviewed and updated the history and problem list.  I  have reconciled the medication list.  I have discussed the hospitalization and current medical issues, prognosis and plans with the patient/family.  I  spent more than 50% of time discussing the care with the patient/family.  Total Face-to-Face Encounter: 60 minutes.    Medications Reconciliation:   I have reconciled the patient's home medications and discharge medications with the patient/family. I have updated all changes.  Refer to After-Visit Medication List.    ASSESSMENT & PLAN:     HIGH RISK CONDITION(S):      Problem List Items Addressed This Visit          Psychiatric    Alcohol withdrawal syndrome, with delirium    Current Assessment & Plan     On Librium daily, 3 days remaining  On folic acid, b12  Report he is doing ok today  Continue plan  Monitor            Cardiac/Vascular    Hypertension    Current Assessment & Plan     /78 on visit  Continue current plan  Monitor          Other Visit Diagnoses       Hospital discharge follow-up    -  Primary    Altered mental status, unspecified altered mental status type        Resolved  Patient is at baseline  CT head was negative  Continue current plan  Monitor                 Were controlled substances prescribed?  No    Instructions for the patient:  - Continue all medications, treatments and therapies as ordered.   - Follow all instructions, recommendations as discussed.  - Maintain Safety Precautions at all times.  - Attend all medical appointments as scheduled.  - For worsening symptoms: call Primary Care Physician or Nurse Practitioner.  - For emergencies, call 911 or immediately report to the nearest emergency room.  - Limit Risks of environmental exposure to coronavirus/COVID-19 as discussed including: social distancing, hand hygiene, and limiting departures from the home for necessities only.     Scheduled Follow-up :  No future appointments.    After Visit Medication List :     Medication List            Accurate as of February 14, 2023  1:23 PM.  If you have any questions, ask your nurse or doctor.                CONTINUE taking these medications      carvediloL 12.5 MG tablet  Commonly known as: COREG     chlordiazepoxide 25 MG Cap  Commonly known as: LIBRIUM  Take 1 capsule (25 mg total) by mouth 3 (three) times daily for 3 days, THEN 1 capsule (25 mg total) 2 (two) times a day for 3 days, THEN 1 capsule (25 mg total) once daily for 5 days.  Start taking on: February 7, 2023     cyanocobalamin 100 MCG tablet  Commonly known as: VITAMIN B-12  Take 1 tablet (100 mcg total) by mouth once daily.     folic acid 1 MG tablet  Commonly known as: FOLVITE  Take 1 tablet (1 mg total) by mouth once daily.     furosemide 40 MG tablet  Commonly known as: LASIX     gabapentin 600 MG tablet  Commonly known as: NEURONTIN     morphine 30 MG 12 hr tablet  Commonly known as: MS CONTIN     ondansetron 4 MG Tbdl  Commonly known as: ZOFRAN-ODT     oxyCODONE 10 mg Tab immediate release tablet  Commonly known as: ROXICODONE     sacubitriL-valsartan 24-26 mg per tablet  Commonly known as: ENTRESTO              Signature: Luis Green NP

## 2023-02-14 NOTE — ASSESSMENT & PLAN NOTE
On Librium daily, 3 days remaining  On folic acid, b12  Report he is doing ok today  Continue plan  Monitor

## 2023-05-08 PROBLEM — N17.9 AKI (ACUTE KIDNEY INJURY): Status: RESOLVED | Noted: 2023-02-05 | Resolved: 2023-05-08

## 2025-03-03 NOTE — PLAN OF CARE
Problem: Adult Inpatient Plan of Care  Goal: Plan of Care Review  Outcome: Ongoing, Progressing     Problem: Fluid and Electrolyte Imbalance (Acute Kidney Injury/Impairment)  Goal: Fluid and Electrolyte Balance  Outcome: Ongoing, Progressing     Problem: Oral Intake Inadequate (Acute Kidney Injury/Impairment)  Goal: Optimal Nutrition Intake  Outcome: Ongoing, Progressing     Problem: Impaired Wound Healing  Goal: Optimal Wound Healing  Outcome: Ongoing, Progressing     Patient remains free from injury. Safety precautions maintained. No s/s of acute distress. Pain controlled per MD order. IVF infusing. Cardiac monitoring in use. Neuro checks and CIWA checsk q4 continued this shift. Avasys at bedside. Chart and orders review completed. Pt education about care completed.      [facility] Attending Follow-up Note     Patient seen and examined at bedside.    Overnight Events:     s/p C nonobs   plan for dc home     REVIEW OF SYSTEMS:  Constitutional:     [x ] negative [ ] fevers [ ] chills [ ] weight loss [ ] weight gain  HEENT:                  [x ] negative [ ] dry eyes [ ] eye irritation [ ] postnasal drip [ ] nasal congestion  CV:                         [ x] negative  [ ] chest pain [ ] orthopnea [ ] palpitations [ ] murmur  Resp:                     [ x] negative [ ] cough [ ] shortness of breath [ ] dyspnea [ ] wheezing [ ] sputum [ ]hemoptysis  GI:                          [ x] negative [ ] nausea [ ] vomiting [ ] diarrhea [ ] constipation [ ] abd pain [ ] dysphagia   :                        [ x] negative [ ] dysuria [ ] nocturia [ ] hematuria [ ] increased urinary frequency  Musculoskeletal: [ x] negative [ ] back pain [ ] myalgias [ ] arthralgias [ ] fracture  Skin:                       [ x] negative [ ] rash [ ] itch  Neurological:        [x ] negative [ ] headache [ ] dizziness [ ] syncope [ ] weakness [ ] numbness  Psychiatric:           [ x] negative [ ] anxiety [ ] depression  Endocrine:            [ x] negative [ ] diabetes [ ] thyroid problem  Heme/Lymph:      [ x] negative [ ] anemia [ ] bleeding problem  Allergic/Immune: [ x] negative [ ] itchy eyes [ ] nasal discharge [ ] hives [ ] angioedema    [ x] All other systems negative  [ ] Unable to assess ROS due to    Current Meds:      PAST MEDICAL & SURGICAL HISTORY:  HTN (hypertension), benign      HLD (hyperlipidemia)      STEMI (ST elevation myocardial infarction)      H/O neck surgery          Vitals:  T(F): 98.2 (03-03), Max: 98.6 (03-03)  HR: 66 (03-03) (60 - 76)  BP: 116/72 (03-03) (107/60 - 142/87)  RR: 18 (03-03)  SpO2: 99% (03-03)  I&O's Summary    03 Mar 2025 07:01  -  04 Mar 2025 07:00  --------------------------------------------------------  IN: 485 mL / OUT: 0 mL / NET: 485 mL        Physical Exam:  Appearance: No acute distress  HENT: No JVD   Cardiovascular: RRR, S1/S2, no murmurs  Respiratory: CTABL  Gastrointestinal: soft, NT ND, +BS  Musculoskeletal: No clubbing, no edema   Neurologic: Non-focal  Skin: No rashes, ecchymoses, or cyanosis                          Cardiovascular Testings:      [facility] Attending Follow-up Note     Patient seen and examined at bedside.    Overnight Events:     no events   for [facility] on 3/3     REVIEW OF SYSTEMS:  Constitutional:     [x ] negative [ ] fevers [ ] chills [ ] weight loss [ ] weight gain  HEENT:                  [x ] negative [ ] dry eyes [ ] eye irritation [ ] postnasal drip [ ] nasal congestion  CV:                         [ x] negative  [ ] chest pain [ ] orthopnea [ ] palpitations [ ] murmur  Resp:                     [ x] negative [ ] cough [ ] shortness of breath [ ] dyspnea [ ] wheezing [ ] sputum [ ]hemoptysis  GI:                          [ x] negative [ ] nausea [ ] vomiting [ ] diarrhea [ ] constipation [ ] abd pain [ ] dysphagia   :                        [ x] negative [ ] dysuria [ ] nocturia [ ] hematuria [ ] increased urinary frequency  Musculoskeletal: [ x] negative [ ] back pain [ ] myalgias [ ] arthralgias [ ] fracture  Skin:                       [ x] negative [ ] rash [ ] itch  Neurological:        [x ] negative [ ] headache [ ] dizziness [ ] syncope [ ] weakness [ ] numbness  Psychiatric:           [ x] negative [ ] anxiety [ ] depression  Endocrine:            [ x] negative [ ] diabetes [ ] thyroid problem  Heme/Lymph:      [ x] negative [ ] anemia [ ] bleeding problem  Allergic/Immune: [ x] negative [ ] itchy eyes [ ] nasal discharge [ ] hives [ ] angioedema    [ x] All other systems negative  [ ] Unable to assess ROS due to    Current Meds:  acetaminophen     Tablet .. 650 milliGRAM(s) Oral every 6 hours PRN  aspirin enteric coated 81 milliGRAM(s) Oral daily  atorvastatin 40 milliGRAM(s) Oral at bedtime  clopidogrel Tablet 75 milliGRAM(s) Oral daily  dextrose 5%. 1000 milliLiter(s) IV Continuous <Continuous>  dextrose 5%. 1000 milliLiter(s) IV Continuous <Continuous>  dextrose 50% Injectable 25 Gram(s) IV Push once  dextrose 50% Injectable 12.5 Gram(s) IV Push once  dextrose 50% Injectable 25 Gram(s) IV Push once  dextrose Oral Gel 15 Gram(s) Oral once PRN  glucagon  Injectable 1 milliGRAM(s) IntraMuscular once  heparin   Injectable 5000 Unit(s) SubCutaneous every 12 hours  influenza   Vaccine 0.5 milliLiter(s) IntraMuscular once  insulin lispro (ADMELOG) corrective regimen sliding scale   SubCutaneous three times a day before meals  insulin lispro (ADMELOG) corrective regimen sliding scale   SubCutaneous at bedtime  metoprolol succinate ER 50 milliGRAM(s) Oral daily  valsartan 40 milliGRAM(s) Oral daily      PAST MEDICAL & SURGICAL HISTORY:  HTN (hypertension), benign      HLD (hyperlipidemia)      STEMI (ST elevation myocardial infarction)      H/O neck surgery          Vitals:  T(F): 97.4 (03-03), Max: 98.9 (03-02)  HR: 58 (03-03) (58 - 75)  BP: 117/71 (03-03) (117/71 - 145/83)  RR: 18 (03-03)  SpO2: 99% (03-03)  I&O's Summary    02 Mar 2025 07:01  -  03 Mar 2025 07:00  --------------------------------------------------------  IN: 870 mL / OUT: 0 mL / NET: 870 mL        Physical Exam:  Appearance: No acute distress  HENT: No JVD   Cardiovascular: RRR, S1/S2, no murmurs  Respiratory: CTABL  Gastrointestinal: soft, NT ND, +BS  Musculoskeletal: No clubbing, no edema   Neurologic: Non-focal  Skin: No rashes, ecchymoses, or cyanosis                          Cardiovascular Testings:      [facility] Attending Follow-up Note     Patient seen and examined at bedside.    Overnight Events:     no events   pending stress test on sun   no cp     REVIEW OF SYSTEMS:  Constitutional:     [x ] negative [ ] fevers [ ] chills [ ] weight loss [ ] weight gain  HEENT:                  [x ] negative [ ] dry eyes [ ] eye irritation [ ] postnasal drip [ ] nasal congestion  CV:                         [ x] negative  [ ] chest pain [ ] orthopnea [ ] palpitations [ ] murmur  Resp:                     [ x] negative [ ] cough [ ] shortness of breath [ ] dyspnea [ ] wheezing [ ] sputum [ ]hemoptysis  GI:                          [ x] negative [ ] nausea [ ] vomiting [ ] diarrhea [ ] constipation [ ] abd pain [ ] dysphagia   :                        [ x] negative [ ] dysuria [ ] nocturia [ ] hematuria [ ] increased urinary frequency  Musculoskeletal: [ x] negative [ ] back pain [ ] myalgias [ ] arthralgias [ ] fracture  Skin:                       [ x] negative [ ] rash [ ] itch  Neurological:        [x ] negative [ ] headache [ ] dizziness [ ] syncope [ ] weakness [ ] numbness  Psychiatric:           [ x] negative [ ] anxiety [ ] depression  Endocrine:            [ x] negative [ ] diabetes [ ] thyroid problem  Heme/Lymph:      [ x] negative [ ] anemia [ ] bleeding problem  Allergic/Immune: [ x] negative [ ] itchy eyes [ ] nasal discharge [ ] hives [ ] angioedema    [ x] All other systems negative  [ ] Unable to assess ROS due to    Current Meds:  acetaminophen     Tablet .. 650 milliGRAM(s) Oral every 6 hours PRN  aspirin enteric coated 81 milliGRAM(s) Oral daily  atorvastatin 40 milliGRAM(s) Oral at bedtime  clopidogrel Tablet 75 milliGRAM(s) Oral daily  dextrose 5%. 1000 milliLiter(s) IV Continuous <Continuous>  dextrose 5%. 1000 milliLiter(s) IV Continuous <Continuous>  dextrose 50% Injectable 25 Gram(s) IV Push once  dextrose 50% Injectable 12.5 Gram(s) IV Push once  dextrose 50% Injectable 25 Gram(s) IV Push once  dextrose Oral Gel 15 Gram(s) Oral once PRN  glucagon  Injectable 1 milliGRAM(s) IntraMuscular once  heparin   Injectable 5000 Unit(s) SubCutaneous every 12 hours  influenza   Vaccine 0.5 milliLiter(s) IntraMuscular once  insulin lispro (ADMELOG) corrective regimen sliding scale   SubCutaneous three times a day before meals  insulin lispro (ADMELOG) corrective regimen sliding scale   SubCutaneous at bedtime  metoprolol succinate ER 50 milliGRAM(s) Oral daily  valsartan 40 milliGRAM(s) Oral daily      PAST MEDICAL & SURGICAL HISTORY:  HTN (hypertension), benign      HLD (hyperlipidemia)      STEMI (ST elevation myocardial infarction)      H/O neck surgery          Vitals:  T(F): 97.8 (03-02), Max: 97.8 (03-02)  HR: 62 (03-02) (62 - 80)  BP: 117/62 (03-02) (102/60 - 162/91)  RR: 18 (03-02)  SpO2: 98% (03-02)  I&O's Summary    01 Mar 2025 07:01  -  02 Mar 2025 07:00  --------------------------------------------------------  IN: 820 mL / OUT: 0 mL / NET: 820 mL    02 Mar 2025 07:01  -  02 Mar 2025 11:15  --------------------------------------------------------  IN: 420 mL / OUT: 0 mL / NET: 420 mL        Physical Exam:  Appearance: No acute distress  HENT: No JVD   Cardiovascular: RRR, S1/S2, no murmurs  Respiratory: CTABL  Gastrointestinal: soft, NT ND, +BS  Musculoskeletal: No clubbing, no edema   Neurologic: Non-focal  Skin: No rashes, ecchymoses, or cyanosis                          14.7   7.27  )-----------( 219      ( 01 Mar 2025 06:38 )             42.4     03-01    142  |  106  |  16  ----------------------------<  109[H]  3.9   |  22  |  1.00    Ca    9.3      01 Mar 2025 06:38  Phos  4.3     03-01  Mg     2.0     03-01    TPro  7.4  /  Alb  4.4  /  TBili  0.4  /  DBili  x   /  AST  18  /  ALT  25  /  AlkPhos  99  02-28    PT/INR - ( 01 Mar 2025 06:38 )   PT: 11.4 sec;   INR: 1.00 ratio         PTT - ( 01 Mar 2025 06:38 )  PTT:33.4 sec              Cardiovascular Testings:      Patient is a 53y old  Male who presents with a chief complaint of CP (28 Feb 2025 22:30)      SUBJECTIVE / OVERNIGHT EVENTS: Patient seen and examined at bedside. Patient without acute complaints this AM. Denies any chest pain, shortness of breath, or abdominal pain.    MEDICATIONS  (STANDING):  aspirin enteric coated 81 milliGRAM(s) Oral daily  atorvastatin 40 milliGRAM(s) Oral at bedtime  clopidogrel Tablet 75 milliGRAM(s) Oral daily  dextrose 5%. 1000 milliLiter(s) (100 mL/Hr) IV Continuous <Continuous>  dextrose 5%. 1000 milliLiter(s) (50 mL/Hr) IV Continuous <Continuous>  dextrose 50% Injectable 25 Gram(s) IV Push once  dextrose 50% Injectable 12.5 Gram(s) IV Push once  dextrose 50% Injectable 25 Gram(s) IV Push once  glucagon  Injectable 1 milliGRAM(s) IntraMuscular once  heparin   Injectable 5000 Unit(s) SubCutaneous every 12 hours  influenza   Vaccine 0.5 milliLiter(s) IntraMuscular once  insulin lispro (ADMELOG) corrective regimen sliding scale   SubCutaneous three times a day before meals  insulin lispro (ADMELOG) corrective regimen sliding scale   SubCutaneous at bedtime  metoprolol succinate ER 50 milliGRAM(s) Oral daily  valsartan 40 milliGRAM(s) Oral daily    MEDICATIONS  (PRN):  acetaminophen     Tablet .. 650 milliGRAM(s) Oral every 6 hours PRN Temp greater or equal to 38C (100.4F), Mild Pain (1 - 3)  dextrose Oral Gel 15 Gram(s) Oral once PRN Blood Glucose LESS THAN 70 milliGRAM(s)/deciliter      Vital Signs Last 24 Hrs  T(C): 36.6 (01 Mar 2025 04:12), Max: 36.7 (28 Feb 2025 12:28)  T(F): 97.8 (01 Mar 2025 04:12), Max: 98.1 (28 Feb 2025 12:28)  HR: 76 (01 Mar 2025 04:12) (59 - 76)  BP: 116/67 (01 Mar 2025 04:12) (116/67 - 172/79)  BP(mean): --  RR: 18 (01 Mar 2025 04:12) (16 - 18)  SpO2: 100% (01 Mar 2025 04:12) (96% - 100%)    Parameters below as of 01 Mar 2025 04:12  Patient On (Oxygen Delivery Method): room air      CAPILLARY BLOOD GLUCOSE      POCT Blood Glucose.: 126 mg/dL (01 Mar 2025 07:40)    I&O's Summary      PHYSICAL EXAM:  GENERAL APPEARANCE: Well developed, NAD  HEENT:  PERRL, EOMI. hearing grossly intact.  CARDIAC: Normal S1 and S2. no mrg. RRR  LUNGS: Clear to auscultation B/L, no rales, rhonchi, or wheezing  ABDOMEN: Soft , NTND, bowel sounds normal. No guarding or rebound.   MUSCULOSKELETAL: No joint erythema or tenderness.   EXTREMITIES: No edema. Peripheral pulses intact.   NEUROLOGICAL: Non focal.  SKIN: Warm and dry , Well perfused  PSYCHIATRIC: AOx3 , Normal mood and affect      LABS:                        14.7   7.27  )-----------( 219      ( 01 Mar 2025 06:38 )             42.4     03-01    142  |  106  |  16  ----------------------------<  109[H]  3.9   |  22  |  1.00    Ca    9.3      01 Mar 2025 06:38  Phos  4.3     03-01  Mg     2.0     03-01    TPro  7.4  /  Alb  4.4  /  TBili  0.4  /  DBili  x   /  AST  18  /  ALT  25  /  AlkPhos  99  02-28    PT/INR - ( 01 Mar 2025 06:38 )   PT: 11.4 sec;   INR: 1.00 ratio         PTT - ( 01 Mar 2025 06:38 )  PTT:33.4 sec      Urinalysis Basic - ( 01 Mar 2025 06:38 )    Color: x / Appearance: x / SG: x / pH: x  Gluc: 109 mg/dL / Ketone: x  / Bili: x / Urobili: x   Blood: x / Protein: x / Nitrite: x   Leuk Esterase: x / RBC: x / WBC x   Sq Epi: x / Non Sq Epi: x / Bacteria: x        RADIOLOGY & ADDITIONAL TESTS:    Imaging Personally Reviewed:    Consultant(s) Notes Reviewed:      Care Discussed with Consultants/Other Providers:       [facility] Division of Hospital Medicine  Arielle Salmeron MD  MS Teams PREFERRED        SUBJECTIVE / OVERNIGHT EVENTS: Seen at bedside. NAD.     MEDICATIONS  (STANDING):  aspirin enteric coated 81 milliGRAM(s) Oral daily  atorvastatin 40 milliGRAM(s) Oral at bedtime  clopidogrel Tablet 75 milliGRAM(s) Oral daily  dextrose 5%. 1000 milliLiter(s) (100 mL/Hr) IV Continuous <Continuous>  dextrose 5%. 1000 milliLiter(s) (50 mL/Hr) IV Continuous <Continuous>  dextrose 50% Injectable 25 Gram(s) IV Push once  dextrose 50% Injectable 12.5 Gram(s) IV Push once  dextrose 50% Injectable 25 Gram(s) IV Push once  glucagon  Injectable 1 milliGRAM(s) IntraMuscular once  heparin   Injectable 5000 Unit(s) SubCutaneous every 12 hours  influenza   Vaccine 0.5 milliLiter(s) IntraMuscular once  insulin lispro (ADMELOG) corrective regimen sliding scale   SubCutaneous three times a day before meals  insulin lispro (ADMELOG) corrective regimen sliding scale   SubCutaneous at bedtime  metoprolol succinate ER 50 milliGRAM(s) Oral daily  valsartan 40 milliGRAM(s) Oral daily    MEDICATIONS  (PRN):  acetaminophen     Tablet .. 650 milliGRAM(s) Oral every 6 hours PRN Temp greater or equal to 38C (100.4F), Mild Pain (1 - 3)  dextrose Oral Gel 15 Gram(s) Oral once PRN Blood Glucose LESS THAN 70 milliGRAM(s)/deciliter      I&O's Summary    02 Mar 2025 07:01  -  03 Mar 2025 07:00  --------------------------------------------------------  IN: 870 mL / OUT: 0 mL / NET: 870 mL    03 Mar 2025 07:01  -  03 Mar 2025 13:10  --------------------------------------------------------  IN: 240 mL / OUT: 0 mL / NET: 240 mL        PHYSICAL EXAM:  Vital Signs Last 24 Hrs  T(C): 36.7 (03 Mar 2025 11:06), Max: 37.2 (02 Mar 2025 20:06)  T(F): 98 (03 Mar 2025 11:06), Max: 98.9 (02 Mar 2025 20:06)  HR: 60 (03 Mar 2025 11:06) (58 - 67)  BP: 139/86 (03 Mar 2025 11:06) (117/71 - 139/86)  BP(mean): --  RR: 18 (03 Mar 2025 11:06) (18 - 18)  SpO2: 99% (03 Mar 2025 11:06) (97% - 99%)    Parameters below as of 03 Mar 2025 11:06  Patient On (Oxygen Delivery Method): room air      PHYSICAL EXAM:  GENERAL APPEARANCE: Well developed, NAD  HEENT:  PERRL, EOMI. hearing grossly intact.  CARDIAC: Normal S1 and S2. no mrg. RRR  LUNGS: Clear to auscultation B/L, no rales, rhonchi, or wheezing  ABDOMEN: Soft , NTND, bowel sounds normal. No guarding or rebound.   MUSCULOSKELETAL: No joint erythema or tenderness.   EXTREMITIES: No edema. Peripheral pulses intact.   NEUROLOGICAL: Non focal.  SKIN: Warm and dry , Well perfused  PSYCHIATRIC: AOx3 , Normal mood and affect     Patient is a 53y old  Male who presents with a chief complaint of CP (01 Mar 2025 09:17)      SUBJECTIVE / OVERNIGHT EVENTS: Patient seen and examined at bedside. Patient without acute complaints this AM. Denies any chest pain, shortness of breath, or abdominal pain.    MEDICATIONS  (STANDING):  aspirin enteric coated 81 milliGRAM(s) Oral daily  atorvastatin 40 milliGRAM(s) Oral at bedtime  clopidogrel Tablet 75 milliGRAM(s) Oral daily  dextrose 5%. 1000 milliLiter(s) (100 mL/Hr) IV Continuous <Continuous>  dextrose 5%. 1000 milliLiter(s) (50 mL/Hr) IV Continuous <Continuous>  dextrose 50% Injectable 25 Gram(s) IV Push once  dextrose 50% Injectable 12.5 Gram(s) IV Push once  dextrose 50% Injectable 25 Gram(s) IV Push once  glucagon  Injectable 1 milliGRAM(s) IntraMuscular once  heparin   Injectable 5000 Unit(s) SubCutaneous every 12 hours  influenza   Vaccine 0.5 milliLiter(s) IntraMuscular once  insulin lispro (ADMELOG) corrective regimen sliding scale   SubCutaneous three times a day before meals  insulin lispro (ADMELOG) corrective regimen sliding scale   SubCutaneous at bedtime  metoprolol succinate ER 50 milliGRAM(s) Oral daily  valsartan 40 milliGRAM(s) Oral daily    MEDICATIONS  (PRN):  acetaminophen     Tablet .. 650 milliGRAM(s) Oral every 6 hours PRN Temp greater or equal to 38C (100.4F), Mild Pain (1 - 3)  dextrose Oral Gel 15 Gram(s) Oral once PRN Blood Glucose LESS THAN 70 milliGRAM(s)/deciliter      Vital Signs Last 24 Hrs  T(C): 36.6 (02 Mar 2025 04:58), Max: 36.6 (01 Mar 2025 10:54)  T(F): 97.8 (02 Mar 2025 04:58), Max: 97.9 (01 Mar 2025 10:54)  HR: 62 (02 Mar 2025 04:58) (62 - 80)  BP: 117/62 (02 Mar 2025 05:12) (102/60 - 162/91)  BP(mean): --  RR: 18 (02 Mar 2025 04:58) (18 - 18)  SpO2: 98% (02 Mar 2025 04:58) (97% - 100%)    Parameters below as of 02 Mar 2025 04:58  Patient On (Oxygen Delivery Method): room air      CAPILLARY BLOOD GLUCOSE      POCT Blood Glucose.: 133 mg/dL (02 Mar 2025 07:50)  POCT Blood Glucose.: 150 mg/dL (01 Mar 2025 21:22)  POCT Blood Glucose.: 111 mg/dL (01 Mar 2025 17:09)  POCT Blood Glucose.: 136 mg/dL (01 Mar 2025 11:43)    I&O's Summary    01 Mar 2025 07:01  -  02 Mar 2025 07:00  --------------------------------------------------------  IN: 820 mL / OUT: 0 mL / NET: 820 mL        PHYSICAL EXAM:  GENERAL APPEARANCE: Well developed, NAD  HEENT:  PERRL, EOMI. hearing grossly intact.  CARDIAC: Normal S1 and S2. no mrg. RRR  LUNGS: Clear to auscultation B/L, no rales, rhonchi, or wheezing  ABDOMEN: Soft , NTND, bowel sounds normal. No guarding or rebound.   MUSCULOSKELETAL: No joint erythema or tenderness.   EXTREMITIES: No edema. Peripheral pulses intact.   NEUROLOGICAL: Non focal.  SKIN: Warm and dry , Well perfused  PSYCHIATRIC: AOx3 , Normal mood and affect      LABS:                        14.7   7.27  )-----------( 219      ( 01 Mar 2025 06:38 )             42.4     03-01    142  |  106  |  16  ----------------------------<  109[H]  3.9   |  22  |  1.00    Ca    9.3      01 Mar 2025 06:38  Phos  4.3     03-01  Mg     2.0     03-01    TPro  7.4  /  Alb  4.4  /  TBili  0.4  /  DBili  x   /  AST  18  /  ALT  25  /  AlkPhos  99  02-28    PT/INR - ( 01 Mar 2025 06:38 )   PT: 11.4 sec;   INR: 1.00 ratio         PTT - ( 01 Mar 2025 06:38 )  PTT:33.4 sec      Urinalysis Basic - ( 01 Mar 2025 06:38 )    Color: x / Appearance: x / SG: x / pH: x  Gluc: 109 mg/dL / Ketone: x  / Bili: x / Urobili: x   Blood: x / Protein: x / Nitrite: x   Leuk Esterase: x / RBC: x / WBC x   Sq Epi: x / Non Sq Epi: x / Bacteria: x        RADIOLOGY & ADDITIONAL TESTS:    Imaging Personally Reviewed:    Consultant(s) Notes Reviewed:      Care Discussed with Consultants/Other Providers: